# Patient Record
Sex: FEMALE | Race: WHITE | NOT HISPANIC OR LATINO | Employment: FULL TIME | URBAN - METROPOLITAN AREA
[De-identification: names, ages, dates, MRNs, and addresses within clinical notes are randomized per-mention and may not be internally consistent; named-entity substitution may affect disease eponyms.]

---

## 2019-08-08 ENCOUNTER — HOSPITAL ENCOUNTER (EMERGENCY)
Facility: HOSPITAL | Age: 29
Discharge: HOME | End: 2019-08-08
Attending: EMERGENCY MEDICINE
Payer: COMMERCIAL

## 2019-08-08 VITALS
OXYGEN SATURATION: 100 % | TEMPERATURE: 97.9 F | RESPIRATION RATE: 16 BRPM | DIASTOLIC BLOOD PRESSURE: 52 MMHG | SYSTOLIC BLOOD PRESSURE: 110 MMHG | HEART RATE: 94 BPM | WEIGHT: 100.97 LBS

## 2019-08-08 DIAGNOSIS — K04.7 DENTAL ABSCESS: Primary | ICD-10-CM

## 2019-08-08 PROCEDURE — 99282 EMERGENCY DEPT VISIT SF MDM: CPT

## 2019-08-08 PROCEDURE — 99283 EMERGENCY DEPT VISIT LOW MDM: CPT | Performed by: PHYSICIAN ASSISTANT

## 2019-08-08 RX ORDER — CLINDAMYCIN HYDROCHLORIDE 150 MG/1
150 CAPSULE ORAL 4 TIMES DAILY
Qty: 40 CAPSULE | Refills: 0 | Status: SHIPPED | OUTPATIENT
Start: 2019-08-08 | End: 2019-08-16 | Stop reason: SDUPTHER

## 2019-08-08 RX ORDER — CLINDAMYCIN HYDROCHLORIDE 150 MG/1
150 CAPSULE ORAL 4 TIMES DAILY
Qty: 40 CAPSULE | Refills: 0 | Status: SHIPPED | OUTPATIENT
Start: 2019-08-08 | End: 2019-08-19 | Stop reason: HOSPADM

## 2019-08-08 NOTE — ED PROVIDER NOTES
History  Chief Complaint   Patient presents with    Dental Pain     left side dental pain and swelling since yesterday     Patient is a 78-year-old female presents today with a chief complaint of left-sided lower jaw dental pain and swelling  Patient reports she has had previous dental infections and reports poor dentition however notes that she is attempting to follow the dentist and is attempting to make time to make these appointments and the dental recommendations  Patient denies any fevers, chills, difficulty turning her neck, difficulty managing oral secretions, difficulty breathing or difficulty swallowing associated with her dental pain/jaw swelling  Patient reports she does not have any pain and notes that the swelling is what brought her into the emergency department today  Patient reports she has not been taking any medications to alleviate her symptoms and reports last time she had these symptoms she was prescribed an antibiotic  History provided by:  Patient   used: No    Dental Pain   Location:  Lower  Severity:  No pain  Onset quality:  Gradual  Duration:  1 day  Timing:  Constant  Progression:  Unchanged  Chronicity:  Recurrent  Context: abscess, dental caries, dental fracture and poor dentition    Relieved by:  None tried  Worsened by:  Nothing  Ineffective treatments:  None tried  Associated symptoms: no congestion and no fever    Risk factors: lack of dental care, periodontal disease and smoking        None       History reviewed  No pertinent past medical history  History reviewed  No pertinent surgical history  History reviewed  No pertinent family history  I have reviewed and agree with the history as documented      Social History     Tobacco Use    Smoking status: Current Every Day Smoker     Packs/day: 0 25    Smokeless tobacco: Never Used   Substance Use Topics    Alcohol use: Yes     Frequency: Never     Comment: rare    Drug use: Never        Review of Systems   Constitutional: Negative for chills, fatigue and fever  HENT: Positive for dental problem  Negative for congestion, ear pain, rhinorrhea and sore throat  Eyes: Negative for redness  Respiratory: Negative for chest tightness and shortness of breath  Cardiovascular: Negative for chest pain and palpitations  Gastrointestinal: Negative for abdominal pain, nausea and vomiting  Genitourinary: Negative for dysuria and hematuria  Musculoskeletal: Negative  Skin: Negative for rash  Neurological: Negative for dizziness, syncope, light-headedness and numbness  Physical Exam  Physical Exam   Constitutional: She is oriented to person, place, and time  She appears well-developed and well-nourished  HENT:   Head: Normocephalic  Significant dental caries and erosion of all teeth noted in the mouth  Few teeth missing with erosion down into the gum/root  No areas of fluctuance or abscess is noted within the mouth or on the gums  Eyes: No scleral icterus  Cardiovascular: Normal rate and regular rhythm  Pulmonary/Chest: Effort normal and breath sounds normal  No stridor  Abdominal: Soft  She exhibits no distension  There is no tenderness  Musculoskeletal: Normal range of motion  Neurological: She is alert and oriented to person, place, and time  Skin: Skin is warm and dry  Capillary refill takes less than 2 seconds  Psychiatric: She has a normal mood and affect  Nursing note and vitals reviewed        Vital Signs  ED Triage Vitals [08/08/19 1006]   Temperature Pulse Respirations Blood Pressure SpO2   97 9 °F (36 6 °C) 94 16 110/52 100 %      Temp Source Heart Rate Source Patient Position - Orthostatic VS BP Location FiO2 (%)   Tympanic Monitor Sitting Left arm --      Pain Score       4           Vitals:    08/08/19 1006   BP: 110/52   Pulse: 94   Patient Position - Orthostatic VS: Sitting         Visual Acuity      ED Medications  Medications - No data to display    Diagnostic Studies  Results Reviewed     None                 No orders to display              Procedures  Procedures       ED Course                               MDM    Disposition  Final diagnoses:   Dental abscess     Time reflects when diagnosis was documented in both MDM as applicable and the Disposition within this note     Time User Action Codes Description Comment    8/8/2019 10:24 AM Yves Londono Add [K04 7] Dental abscess       ED Disposition     ED Disposition Condition Date/Time Comment    Discharge Good Thu Aug 8, 2019 10:23 AM Cassidy Duverney discharge to home/self care  Follow-up Information     Follow up With Specialties Details Why Contact Info    Howie Park MD Family Medicine Schedule an appointment as soon as possible for a visit  As needed [address] [facility] [address] [facility] [address]             Discharge Medication List as of 8/8/2019 10:27 AM      START taking these medications    Details   !! clindamycin (CLEOCIN) 150 mg capsule Take 1 capsule (150 mg total) by mouth 4 (four) times a day for 10 days, Starting Thu 8/8/2019, Until Sun 8/18/2019, Print      !! clindamycin (CLEOCIN) 150 mg capsule Take 1 capsule (150 mg total) by mouth 4 (four) times a day for 10 days, Starting u 8/8/2019, Until Sun 8/18/2019, Normal       !! - Potential duplicate medications found  Please discuss with provider  No discharge procedures on file      ED Provider  Electronically Signed by           Rebeca Jung PA-C  08/08/19 3481

## 2019-08-16 ENCOUNTER — HOSPITAL ENCOUNTER (EMERGENCY)
Facility: HOSPITAL | Age: 29
Discharge: HOME | End: 2019-08-16
Attending: EMERGENCY MEDICINE | Admitting: EMERGENCY MEDICINE
Payer: COMMERCIAL

## 2019-08-16 ENCOUNTER — HOSPITAL ENCOUNTER (EMERGENCY)
Facility: HOSPITAL | Age: 29
Discharge: HOME | End: 2019-08-16
Payer: COMMERCIAL

## 2019-08-16 VITALS
SYSTOLIC BLOOD PRESSURE: 114 MMHG | TEMPERATURE: 97.6 F | WEIGHT: 97 LBS | HEART RATE: 83 BPM | DIASTOLIC BLOOD PRESSURE: 67 MMHG | RESPIRATION RATE: 18 BRPM | OXYGEN SATURATION: 99 %

## 2019-08-16 VITALS
OXYGEN SATURATION: 99 % | WEIGHT: 99.21 LBS | HEART RATE: 106 BPM | SYSTOLIC BLOOD PRESSURE: 140 MMHG | DIASTOLIC BLOOD PRESSURE: 75 MMHG | TEMPERATURE: 98.4 F | RESPIRATION RATE: 18 BRPM

## 2019-08-16 DIAGNOSIS — T78.40XA ALLERGIC REACTION, INITIAL ENCOUNTER: ICD-10-CM

## 2019-08-16 DIAGNOSIS — L50.9 HIVES: Primary | ICD-10-CM

## 2019-08-16 DIAGNOSIS — L50.8 URTICARIA, ACUTE: Primary | ICD-10-CM

## 2019-08-16 LAB
EXT PREG TEST URINE: NEGATIVE
EXT. CONTROL ED NAV: NORMAL

## 2019-08-16 PROCEDURE — 81025 URINE PREGNANCY TEST: CPT | Performed by: FAMILY MEDICINE

## 2019-08-16 PROCEDURE — 99282 EMERGENCY DEPT VISIT SF MDM: CPT

## 2019-08-16 PROCEDURE — 99283 EMERGENCY DEPT VISIT LOW MDM: CPT | Performed by: EMERGENCY MEDICINE

## 2019-08-16 PROCEDURE — 99284 EMERGENCY DEPT VISIT MOD MDM: CPT | Performed by: PHYSICIAN ASSISTANT

## 2019-08-16 RX ORDER — DESOGESTREL AND ETHINYL ESTRADIOL 0.15-0.03
1 KIT ORAL DAILY
COMMUNITY
Start: 2019-04-16

## 2019-08-16 RX ORDER — FAMOTIDINE 20 MG/1
20 TABLET, FILM COATED ORAL 2 TIMES DAILY
Qty: 30 TABLET | Refills: 0 | Status: ON HOLD | OUTPATIENT
Start: 2019-08-16 | End: 2019-08-19 | Stop reason: SDUPTHER

## 2019-08-16 RX ORDER — PREDNISONE 20 MG/1
60 TABLET ORAL ONCE
Status: COMPLETED | OUTPATIENT
Start: 2019-08-16 | End: 2019-08-16

## 2019-08-16 RX ORDER — DIPHENHYDRAMINE HCL 25 MG
25 TABLET ORAL ONCE
Status: COMPLETED | OUTPATIENT
Start: 2019-08-16 | End: 2019-08-16

## 2019-08-16 RX ORDER — FAMOTIDINE 20 MG/1
40 TABLET, FILM COATED ORAL ONCE
Status: COMPLETED | OUTPATIENT
Start: 2019-08-16 | End: 2019-08-16

## 2019-08-16 RX ORDER — PREDNISONE 20 MG/1
60 TABLET ORAL DAILY
Qty: 15 TABLET | Refills: 0 | Status: SHIPPED | OUTPATIENT
Start: 2019-08-16 | End: 2019-08-19 | Stop reason: HOSPADM

## 2019-08-16 RX ORDER — DIPHENHYDRAMINE HCL 25 MG
25 TABLET ORAL EVERY 6 HOURS
Qty: 20 TABLET | Refills: 0 | Status: SHIPPED | OUTPATIENT
Start: 2019-08-16 | End: 2019-08-19 | Stop reason: HOSPADM

## 2019-08-16 RX ADMIN — DIPHENHYDRAMINE HCL 25 MG: 25 TABLET ORAL at 12:18

## 2019-08-16 RX ADMIN — DIPHENHYDRAMINE HCL 25 MG: 25 TABLET ORAL at 19:19

## 2019-08-16 RX ADMIN — FAMOTIDINE 40 MG: 20 TABLET ORAL at 19:19

## 2019-08-16 RX ADMIN — PREDNISONE 60 MG: 20 TABLET ORAL at 12:17

## 2019-08-16 NOTE — DISCHARGE INSTRUCTIONS
Take 3 steroid pills a day for 5 days  Finish your antibiotic you already started  Use topical benadryl for the itching

## 2019-08-16 NOTE — ED PROVIDER NOTES
History  Chief Complaint   Patient presents with    Itching     "I was here earlier but they medicine they gave me hasn't helped"  " hives are worse now"  denies difficulty breathing     25-year-old female with no significant past medical history presenting for evaluation of rash  Patient was seen earlier today for rash in likely allergic reaction to clindamycin that she started 1 week ago  She was given 60 mg of prednisone along with Benadryl in the ED  Patient was discharged with Benadryl cream along with rx prednisone  She reports that she went home to take a nap and when she woke up she had worsening of her hives and now irritation and inflammation of the right hand  She denies any difficulty breathing coughing stridor or wheezing  No swelling of tongue or lips  Of note, she does report that she just received new tattoo 3 days ago as well  Prior to Admission Medications   Prescriptions Last Dose Informant Patient Reported? Taking? clindamycin (CLEOCIN) 150 mg capsule Not Taking at Unknown time  No No   Sig: Take 1 capsule (150 mg total) by mouth 4 (four) times a day for 10 days   Patient not taking: Reported on 8/16/2019   desogestrel-ethinyl estradiol (APRI) 0 15-30 MG-MCG per tablet   Yes No   Sig: Take 1 tablet by mouth daily   diphenhydrAMINE-zinc acetate (BENADRYL) cream   No No   Sig: Apply topically 3 (three) times a day as needed for itching   predniSONE 20 mg tablet   No No   Sig: Take 3 tablets (60 mg total) by mouth daily for 5 days      Facility-Administered Medications: None       History reviewed  No pertinent past medical history  History reviewed  No pertinent surgical history  History reviewed  No pertinent family history  I have reviewed and agree with the history as documented      Social History     Tobacco Use    Smoking status: Current Every Day Smoker     Packs/day: 0 25    Smokeless tobacco: Never Used   Substance Use Topics    Alcohol use: Yes     Frequency: Never     Comment: rare    Drug use: Never        Review of Systems   All other systems reviewed and are negative  Physical Exam  Physical Exam   Constitutional: She is oriented to person, place, and time  She appears well-developed and well-nourished  No distress  HENT:   Head: Normocephalic and atraumatic  Eyes: Conjunctivae are normal    EOM grossly intact   Neck: Normal range of motion  Neck supple  No JVD present  Cardiovascular: Normal rate  Pulmonary/Chest: Effort normal    Abdominal: Soft  Musculoskeletal:   FROM, steady gait, cap refill brisk, strength and sensation grossly intact throughout   Neurological: She is alert and oriented to person, place, and time  Skin: Skin is warm and dry  Capillary refill takes less than 2 seconds  Psychiatric: She has a normal mood and affect  Her behavior is normal    Nursing note and vitals reviewed        Vital Signs  ED Triage Vitals [08/16/19 1844]   Temperature Pulse Respirations Blood Pressure SpO2   98 4 °F (36 9 °C) (!) 106 18 140/75 99 %      Temp Source Heart Rate Source Patient Position - Orthostatic VS BP Location FiO2 (%)   Tympanic Monitor Sitting Left arm --      Pain Score       --           Vitals:    08/16/19 1844   BP: 140/75   Pulse: (!) 106   Patient Position - Orthostatic VS: Sitting         Visual Acuity      ED Medications  Medications   diphenhydrAMINE (BENADRYL) tablet 25 mg (25 mg Oral Given 8/16/19 1919)   famotidine (PEPCID) tablet 40 mg (40 mg Oral Given 8/16/19 1919)       Diagnostic Studies  Results Reviewed     None                 No orders to display              Procedures  Procedures       ED Course                               MDM  Number of Diagnoses or Management Options  Allergic reaction, initial encounter:   Hives:   Diagnosis management comments: 26 yo F presenting for evaluation of rash, likely urticarial allergic reaction, will give pt pepcid and benadryl here, will send home with rx for same as she was not given when she was d/c earlier today, no resp distress, no sloughing of skin, she is afebrile, non toxic, f/u with pcp    strict return to ED precautions discussed  Pt verbalizes understanding and agrees with plan  Pt is stable for discharge    Portions of the record may have been created with voice recognition software  Occasional wrong word or "sound a like" substitutions may have occurred due to the inherent limitations of voice recognition software  Read the chart carefully and recognize, using context, where substitutions have occurred  Disposition  Final diagnoses:   Hives   Allergic reaction, initial encounter     Time reflects when diagnosis was documented in both MDM as applicable and the Disposition within this note     Time User Action Codes Description Comment    8/16/2019  7:37 PM Fallon Gordon Add [L50 9] Hives     8/16/2019  7:37 PM Hever Monroe 142 Allergic reaction, initial encounter       ED Disposition     ED Disposition Condition Date/Time Comment    Discharge Stable Fri Aug 16, 2019  7:37 PM Nadine Garcia discharge to home/self care              Follow-up Information     Follow up With Specialties Details Why [address] [facility] Medicine Call in 1 day  [address] [facility]  [phone]            Discharge Medication List as of 8/16/2019  7:38 PM      START taking these medications    Details   diphenhydrAMINE (BENADRYL) 25 mg tablet Take 1 tablet (25 mg total) by mouth every 6 (six) hours, Starting Fri 8/16/2019, Normal      famotidine (PEPCID) 20 mg tablet Take 1 tablet (20 mg total) by mouth 2 (two) times a day, Starting Fri 8/16/2019, Normal         CONTINUE these medications which have NOT CHANGED    Details   clindamycin (CLEOCIN) 150 mg capsule Take 1 capsule (150 mg total) by mouth 4 (four) times a day for 10 days, Starting Thu 8/8/2019, Until Sun 8/18/2019, Normal      desogestrel-ethinyl estradiol (APRI) 0 15-30 MG-MCG per tablet Take 1 tablet by mouth daily, Starting Tue 4/16/2019, Historical Med      diphenhydrAMINE-zinc acetate (BENADRYL) cream Apply topically 3 (three) times a day as needed for itching, Starting Fri 8/16/2019, Print      predniSONE 20 mg tablet Take 3 tablets (60 mg total) by mouth daily for 5 days, Starting Fri 8/16/2019, Until Wed 8/21/2019, Print           No discharge procedures on file      ED Provider  Electronically Signed by           Nilsa Perez PA-C  08/16/19 8812

## 2019-08-16 NOTE — ED PROVIDER NOTES
History  Chief Complaint   Patient presents with    Rash     "I broke out in hives all over today, I have been taking Clindamycin all week for my tooth"  + itching     Pleasant 25 yr old female c/o acute onset rash spreading all over her body  A small welt began on her left arm, which she thought was just a bug bite this morning  Has been itching non stop since and rash has spread over all extremeties varying is lesion size  No vesicular lesions or sloughing  No close contacts have similar symptoms  No new detergent, body wash/lotions, or oral intake  Tried topical Cortizone cream with no relief  Patient just wants the itching to stop  No dysphagia, difficulty swallowing secretions, sore throat, tongue swelling or signs of angioedema  Currently patient denies any fever, chills, chest pain, palpitations, light headedness, headaches, vision change, abdominal pain, N/V/D, urinary symptoms  Prior to Admission Medications   Prescriptions Last Dose Informant Patient Reported? Taking? clindamycin (CLEOCIN) 150 mg capsule   No No   Sig: Take 1 capsule (150 mg total) by mouth 4 (four) times a day for 10 days   desogestrel-ethinyl estradiol (APRI) 0 15-30 MG-MCG per tablet   Yes Yes   Sig: Take 1 tablet by mouth daily      Facility-Administered Medications: None       History reviewed  No pertinent past medical history  History reviewed  No pertinent surgical history  History reviewed  No pertinent family history  I have reviewed and agree with the history as documented  Social History     Tobacco Use    Smoking status: Current Every Day Smoker     Packs/day: 0 25    Smokeless tobacco: Never Used   Substance Use Topics    Alcohol use: Yes     Frequency: Never     Comment: rare    Drug use: Never        Review of Systems   Constitutional: Negative  Negative for activity change, appetite change, chills, fatigue and fever  HENT: Negative    Negative for congestion, dental problem, ear pain and sore throat  Eyes: Negative  Respiratory: Negative  Negative for chest tightness and shortness of breath  Cardiovascular: Negative  Negative for chest pain, palpitations and leg swelling  Gastrointestinal: Negative  Negative for abdominal distention, abdominal pain, constipation, diarrhea, nausea and vomiting  Endocrine: Negative  Genitourinary: Negative  Negative for difficulty urinating, dyspareunia and dysuria  Musculoskeletal: Negative  Negative for arthralgias and neck pain  Skin: Positive for rash (pruritic)  Allergic/Immunologic: Negative  Neurological: Negative  Negative for dizziness, weakness, light-headedness, numbness and headaches  Hematological: Negative  Negative for adenopathy  Psychiatric/Behavioral: Negative  Negative for sleep disturbance and suicidal ideas  Physical Exam  ED Triage Vitals [08/16/19 1133]   Temperature Pulse Respirations Blood Pressure SpO2   97 6 °F (36 4 °C) 83 18 114/67 99 %      Temp Source Heart Rate Source Patient Position - Orthostatic VS BP Location FiO2 (%)   Tympanic Monitor Sitting Left arm --      Pain Score       --             Orthostatic Vital Signs  Vitals:    08/16/19 1133   BP: 114/67   Pulse: 83   Patient Position - Orthostatic VS: Sitting       Physical Exam   Constitutional: She is oriented to person, place, and time  She appears well-developed and well-nourished  HENT:   Head: Normocephalic and atraumatic  Eyes: Pupils are equal, round, and reactive to light  EOM are normal    Neck: Normal range of motion  Neck supple  Cardiovascular: Normal rate, regular rhythm, normal heart sounds and intact distal pulses  No murmur heard  Pulmonary/Chest: Effort normal and breath sounds normal  No respiratory distress  She exhibits no tenderness  Abdominal: Soft  Bowel sounds are normal  She exhibits no distension  There is no tenderness  Musculoskeletal: Normal range of motion     Neurological: She is alert and oriented to person, place, and time  Skin: Skin is warm and dry  Capillary refill takes less than 2 seconds  Rash (diffuse) noted  No abrasion, no bruising, no burn, no ecchymosis, no laceration, no lesion, no petechiae and no purpura noted  Rash is urticarial  Rash is not macular, not papular, not maculopapular, not nodular, not pustular and not vesicular  There is erythema  Psychiatric: She has a normal mood and affect  Nursing note and vitals reviewed  ED Medications  Medications   predniSONE tablet 60 mg (60 mg Oral Given 8/16/19 1217)   diphenhydrAMINE (BENADRYL) tablet 25 mg (25 mg Oral Given 8/16/19 1218)       Diagnostic Studies  Results Reviewed     Procedure Component Value Units Date/Time    POCT pregnancy, urine [[id number]]  (Normal) Resulted:  08/16/19 1215    Lab Status:  Final result Updated:  08/16/19 1215     EXT PREG TEST UR (Ref: Negative) negative     Control valid                 No orders to display         Procedures  Procedures        ED Course                               MDM  Number of Diagnoses or Management Options  Urticaria, acute:   Diagnosis management comments: Pleasant 25 yr old female c/o acute onset rash spreading all over her body  Urtical rash is blanching and pruritic  Non-painful with no sloughing, clusters or vesicular lesions  Does not appear to be a bulls-eye rash  Likely idiopathic allergic reaction  Will treat with steroids and benadryl  Discharge patient with benadryl cream and 5 day course of steroids  Patient understands strict return policy if noticing tongue swelling, difficulty breathing, or worsening symptoms         Disposition  Final diagnoses:   Urticaria, acute     Time reflects when diagnosis was documented in both MDM as applicable and the Disposition within this note     Time User Action Codes Description Comment    8/16/2019 12:12 PM NemAnuja bernal Add [L50 8] Urticaria, acute       ED Disposition     ED Disposition Condition Date/Time Comment Discharge Stable Fri Aug 16, 2019 12:17 PM Lalo Duron discharge to home/self care  Follow-up Information     Follow up With Specialties Details Why Contact Info Additional [address] [facility] Emergency Department Emergency Medicine  If symptoms worsen or you have difficulty breathing or swallowing  [address] [address]  [address] [facility]  As needed to establish care with a PCP [address], [address] [facility] [address] [facility], [address], [address] [place], [place], [address]          Discharge Medication List as of 8/16/2019 12:20 PM      START taking these medications    Details   diphenhydrAMINE-zinc acetate (BENADRYL) cream Apply topically 3 (three) times a day as needed for itching, Starting Fri 8/16/2019, Print      predniSONE 20 mg tablet Take 3 tablets (60 mg total) by mouth daily for 5 days, Starting Fri 8/16/2019, Until Wed 8/21/2019, Print         CONTINUE these medications which have NOT CHANGED    Details   desogestrel-ethinyl estradiol (APRI) 0 15-30 MG-MCG per tablet Take 1 tablet by mouth daily, Starting Tue 4/16/2019, Historical Med      clindamycin (CLEOCIN) 150 mg capsule Take 1 capsule (150 mg total) by mouth 4 (four) times a day for 10 days, Starting Thu 8/8/2019, Until Sun 8/18/2019, Normal           No discharge procedures on file  ED Provider  Attending physically available and evaluated Lalo Duron I managed the patient along with the ED Attending      Electronically Signed by         Yola Nash MD  08/16/19 9449

## 2019-08-17 ENCOUNTER — HOSPITAL ENCOUNTER (OUTPATIENT)
Facility: HOSPITAL | Age: 29
Setting detail: OBSERVATION
Discharge: HOME | End: 2019-08-19
Attending: EMERGENCY MEDICINE | Admitting: FAMILY MEDICINE
Payer: COMMERCIAL

## 2019-08-17 DIAGNOSIS — L50.8 ACUTE URTICARIA: ICD-10-CM

## 2019-08-17 DIAGNOSIS — T78.40XA ALLERGIC REACTION, INITIAL ENCOUNTER: ICD-10-CM

## 2019-08-17 DIAGNOSIS — L50.9 HIVES: ICD-10-CM

## 2019-08-17 DIAGNOSIS — L50.9 URTICARIAL DERMATITIS: Primary | ICD-10-CM

## 2019-08-17 PROBLEM — Z72.0 TOBACCO USE: Status: ACTIVE | Noted: 2019-08-17

## 2019-08-17 LAB
ANION GAP SERPL CALCULATED.3IONS-SCNC: 11 MMOL/L (ref 5–14)
BUN SERPL-MCNC: 16 MG/DL (ref 5–25)
CALCIUM SERPL-MCNC: 9.2 MG/DL (ref 8.4–10.2)
CHLORIDE SERPL-SCNC: 99 MMOL/L (ref 97–108)
CO2 SERPL-SCNC: 23 MMOL/L (ref 22–30)
CREAT SERPL-MCNC: 0.5 MG/DL (ref 0.6–1.2)
ERYTHROCYTE [DISTWIDTH] IN BLOOD BY AUTOMATED COUNT: 15.9 %
GFR SERPL CREATININE-BSD FRML MDRD: 130 ML/MIN/1.73SQ M
GLUCOSE SERPL-MCNC: 186 MG/DL (ref 70–99)
HCT VFR BLD AUTO: 45.8 % (ref 36–46)
HGB BLD-MCNC: 14.7 G/DL (ref 12–16)
LYMPHOCYTES # BLD AUTO: 1.06 THOUSAND/UL (ref 0.5–4)
LYMPHOCYTES # BLD AUTO: 10 % (ref 25–45)
MCH RBC QN AUTO: 24.4 PG (ref 26–34)
MCHC RBC AUTO-ENTMCNC: 32 G/DL (ref 31–36)
MCV RBC AUTO: 76 FL (ref 80–100)
MONOCYTES # BLD AUTO: 0.32 THOUSAND/UL (ref 0.2–0.9)
MONOCYTES NFR BLD AUTO: 3 % (ref 1–10)
NEUTS BAND NFR BLD MANUAL: 7 % (ref 0–8)
NEUTS SEG # BLD: 9.22 THOUSAND/UL (ref 1.8–7.8)
NEUTS SEG NFR BLD AUTO: 80 %
PLATELET # BLD AUTO: 175 THOUSANDS/UL (ref 150–450)
PLATELET BLD QL SMEAR: ADEQUATE
PMV BLD AUTO: 10.4 FL (ref 8.9–12.7)
POTASSIUM SERPL-SCNC: 4.2 MMOL/L (ref 3.6–5)
RBC # BLD AUTO: 6.01 MILLION/UL (ref 4–5.2)
RBC MORPH BLD: NORMAL
SODIUM SERPL-SCNC: 133 MMOL/L (ref 137–147)
TOTAL CELLS COUNTED SPEC: 100
WBC # BLD AUTO: 10.6 THOUSAND/UL (ref 4.5–11)

## 2019-08-17 PROCEDURE — 99284 EMERGENCY DEPT VISIT MOD MDM: CPT

## 2019-08-17 PROCEDURE — 99284 EMERGENCY DEPT VISIT MOD MDM: CPT | Performed by: PHYSICIAN ASSISTANT

## 2019-08-17 PROCEDURE — 36415 COLL VENOUS BLD VENIPUNCTURE: CPT | Performed by: PHYSICIAN ASSISTANT

## 2019-08-17 PROCEDURE — 80048 BASIC METABOLIC PNL TOTAL CA: CPT | Performed by: PHYSICIAN ASSISTANT

## 2019-08-17 PROCEDURE — 85007 BL SMEAR W/DIFF WBC COUNT: CPT | Performed by: PHYSICIAN ASSISTANT

## 2019-08-17 PROCEDURE — 96374 THER/PROPH/DIAG INJ IV PUSH: CPT

## 2019-08-17 PROCEDURE — 85027 COMPLETE CBC AUTOMATED: CPT | Performed by: PHYSICIAN ASSISTANT

## 2019-08-17 PROCEDURE — 99219 PR INITIAL OBSERVATION CARE/DAY 50 MINUTES: CPT | Performed by: FAMILY MEDICINE

## 2019-08-17 RX ORDER — HYDROXYZINE HYDROCHLORIDE 25 MG/1
50 TABLET, FILM COATED ORAL EVERY 6 HOURS
Status: DISCONTINUED | OUTPATIENT
Start: 2019-08-17 | End: 2019-08-19 | Stop reason: HOSPADM

## 2019-08-17 RX ORDER — FAMOTIDINE 20 MG/1
20 TABLET, FILM COATED ORAL 2 TIMES DAILY
Status: DISCONTINUED | OUTPATIENT
Start: 2019-08-17 | End: 2019-08-19 | Stop reason: HOSPADM

## 2019-08-17 RX ORDER — LORATADINE 10 MG/1
10 TABLET ORAL DAILY
Status: DISCONTINUED | OUTPATIENT
Start: 2019-08-18 | End: 2019-08-19 | Stop reason: HOSPADM

## 2019-08-17 RX ORDER — DIPHENHYDRAMINE HYDROCHLORIDE 50 MG/ML
50 INJECTION INTRAMUSCULAR; INTRAVENOUS ONCE
Status: COMPLETED | OUTPATIENT
Start: 2019-08-17 | End: 2019-08-17

## 2019-08-17 RX ORDER — ONDANSETRON 2 MG/ML
4 INJECTION INTRAMUSCULAR; INTRAVENOUS EVERY 6 HOURS PRN
Status: DISCONTINUED | OUTPATIENT
Start: 2019-08-17 | End: 2019-08-19 | Stop reason: HOSPADM

## 2019-08-17 RX ORDER — METHYLPREDNISOLONE SODIUM SUCCINATE 40 MG/ML
40 INJECTION, POWDER, LYOPHILIZED, FOR SOLUTION INTRAMUSCULAR; INTRAVENOUS EVERY 8 HOURS SCHEDULED
Status: DISCONTINUED | OUTPATIENT
Start: 2019-08-17 | End: 2019-08-19 | Stop reason: HOSPADM

## 2019-08-17 RX ORDER — SODIUM CHLORIDE, SODIUM LACTATE, POTASSIUM CHLORIDE, CALCIUM CHLORIDE 600; 310; 30; 20 MG/100ML; MG/100ML; MG/100ML; MG/100ML
100 INJECTION, SOLUTION INTRAVENOUS CONTINUOUS
Status: DISCONTINUED | OUTPATIENT
Start: 2019-08-17 | End: 2019-08-19

## 2019-08-17 RX ORDER — METHYLPREDNISOLONE SODIUM SUCCINATE 125 MG/2ML
150 INJECTION, POWDER, LYOPHILIZED, FOR SOLUTION INTRAMUSCULAR; INTRAVENOUS ONCE
Status: COMPLETED | OUTPATIENT
Start: 2019-08-17 | End: 2019-08-17

## 2019-08-17 RX ORDER — DIPHENHYDRAMINE HYDROCHLORIDE, ZINC ACETATE 2; .1 G/100G; G/100G
CREAM TOPICAL 3 TIMES DAILY PRN
Status: DISCONTINUED | OUTPATIENT
Start: 2019-08-17 | End: 2019-08-19 | Stop reason: HOSPADM

## 2019-08-17 RX ADMIN — SODIUM CHLORIDE, SODIUM LACTATE, POTASSIUM CHLORIDE, AND CALCIUM CHLORIDE 100 ML/HR: .6; .31; .03; .02 INJECTION, SOLUTION INTRAVENOUS at 17:26

## 2019-08-17 RX ADMIN — FAMOTIDINE 20 MG: 20 TABLET ORAL at 17:23

## 2019-08-17 RX ADMIN — METHYLPREDNISOLONE SODIUM SUCCINATE 40 MG: 40 INJECTION, POWDER, FOR SOLUTION INTRAMUSCULAR; INTRAVENOUS at 21:41

## 2019-08-17 RX ADMIN — METHYLPREDNISOLONE SODIUM SUCCINATE 150 MG: 125 INJECTION, POWDER, FOR SOLUTION INTRAMUSCULAR; INTRAVENOUS at 16:05

## 2019-08-17 RX ADMIN — DIPHENHYDRAMINE HYDROCHLORIDE 50 MG: 50 INJECTION INTRAMUSCULAR; INTRAVENOUS at 15:51

## 2019-08-17 RX ADMIN — HYDROXYZINE HYDROCHLORIDE 50 MG: 25 TABLET ORAL at 17:22

## 2019-08-17 NOTE — PLAN OF CARE
Problem: PAIN - ADULT  Goal: Verbalizes/displays adequate comfort level or baseline comfort level  Description  Interventions:  - Encourage patient to monitor pain and request assistance  - Assess pain using appropriate pain scale  - Administer analgesics based on type and severity of pain and evaluate response  - Implement non-pharmacological measures as appropriate and evaluate response  - Consider cultural and social influences on pain and pain management  - Notify physician/advanced practitioner if interventions unsuccessful or patient reports new pain  Outcome: Progressing     Problem: INFECTION - ADULT  Goal: Absence or prevention of progression during hospitalization  Description  INTERVENTIONS:  - Assess and monitor for signs and symptoms of infection  - Monitor lab/diagnostic results  - Monitor all insertion sites, i e  indwelling lines, tubes, and drains  - Monitor endotracheal if appropriate and nasal secretions for changes in amount and color  - [place] appropriate cooling/warming therapies per order  - Administer medications as ordered  - Instruct and encourage patient and family to use good hand hygiene technique  - Identify and instruct in appropriate isolation precautions for identified infection/condition  Outcome: Progressing  Goal: Absence of fever/infection during neutropenic period  Description  INTERVENTIONS:  - Monitor WBC    Outcome: Progressing     Problem: SAFETY ADULT  Goal: Patient will remain free of falls  Description  INTERVENTIONS:  - Assess patient frequently for physical needs  -  Identify cognitive and physical deficits and behaviors that affect risk of falls    -  [place] fall precautions as indicated by assessment   - Educate patient/family on patient safety including physical limitations  - Instruct patient to call for assistance with activity based on assessment  - Modify environment to reduce risk of injury  - Consider OT/PT consult to assist with strengthening/mobility  Outcome: Progressing  Goal: Maintain or return to baseline ADL function  Description  INTERVENTIONS:  -  Assess patient's ability to carry out ADLs; assess patient's baseline for ADL function and identify physical deficits which impact ability to perform ADLs (bathing, care of mouth/teeth, toileting, grooming, dressing, etc )  - Assess/evaluate cause of self-care deficits   - Assess range of motion  - Assess patient's mobility; develop plan if impaired  - Assess patient's need for assistive devices and provide as appropriate  - Encourage maximum independence but intervene and supervise when necessary  - Involve family in performance of ADLs  - Assess for home care needs following discharge   - Consider OT consult to assist with ADL evaluation and planning for discharge  - Provide patient education as appropriate  Outcome: Progressing  Goal: Maintain or return mobility status to optimal level  Description  INTERVENTIONS:  - Assess patient's baseline mobility status (ambulation, transfers, stairs, etc )    - Identify cognitive and physical deficits and behaviors that affect mobility  - Identify mobility aids required to assist with transfers and/or ambulation (gait belt, sit-to-stand, lift, walker, cane, etc )  - [place] fall precautions as indicated by assessment  - Record patient progress and toleration of activity level on Mobility SBAR; progress patient to next Phase/Stage  - Instruct patient to call for assistance with activity based on assessment  - Consider rehabilitation consult to assist with strengthening/weightbearing, etc   Outcome: Progressing     Problem: DISCHARGE PLANNING  Goal: Discharge to home or other facility with appropriate resources  Description  INTERVENTIONS:  - Identify barriers to discharge w/patient and caregiver  - Arrange for needed discharge resources and transportation as appropriate  - Identify discharge learning needs (meds, wound care, etc )  - Arrange for interpretive services to assist at discharge as needed  - Refer to Case Management Department for coordinating discharge planning if the patient needs post-hospital services based on physician/advanced practitioner order or complex needs related to functional status, cognitive ability, or social support system  Outcome: Progressing     Problem: Knowledge Deficit  Goal: Patient/family/caregiver demonstrates understanding of disease process, treatment plan, medications, and discharge instructions  Description  Complete learning assessment and assess knowledge base    Interventions:  - Provide teaching at level of understanding  - Provide teaching via preferred learning methods  Outcome: Progressing

## 2019-08-17 NOTE — NURSING NOTE
PT arrived on floor @ 1700 from ER AOX3 HR regular Lungs clear + Bowel sounds x4 + PP ABD soft  Denies any pain  Generalized Rash PT denies itching

## 2019-08-17 NOTE — ED PROVIDER NOTES
History  Chief Complaint   Patient presents with    Rash     Seen twice yesterday for rash/hives, bilateral hand swelling worse today, taking meds  Denies any trouble breathing  This is a 24 y/o F with no significant PMHx who presents today for an urticarial rash that has been present x 2 days  The patient was seen twice yesterday for the same thing  She reports her rash is significantly worse today  She was put on PO prednisone and told to take benadryl  She reports she vomited the benadryl this AM  Only known documented new medication is clindamycin (she took this over a week ago)  She reports a new tattoo that she got about 3 days ago  She has gotten tattoos at this parlor in the past  She denies any respiratory distress  She has gross facial and hand swelling  Patient did fill medications  Spoke to [facility] who saw patient yesterday - reports rash is significantly worse today  History provided by:  Patient  Rash   Location:  Full body  Quality: itchiness, redness and swelling    Severity:  Moderate  Onset quality:  Sudden  Duration:  2 days  Timing:  Constant  Progression:  Worsening  Chronicity:  New  Context: chemical exposure and medications    Context: not animal contact, not diapers, not eggs, not exposure to similar rash, not food, not hot tub use, not insect bite/sting, not new detergent/soap, not nuts, not plant contact, not pollen, not pregnancy, not sick contacts and not sun exposure    Relieved by:   Antihistamines  Worsened by:  Contact  Ineffective treatments:  Antibiotic cream and antihistamines  Associated symptoms: periorbital edema    Associated symptoms: no abdominal pain, no diarrhea, no fatigue, no fever, no headaches, no hoarse voice, no induration, no joint pain, no myalgias, no nausea, no shortness of breath, no sore throat, no throat swelling, no tongue swelling, no URI, not vomiting and not wheezing        Prior to Admission Medications   Prescriptions Last Dose Informant Patient Reported? Taking? clindamycin (CLEOCIN) 150 mg capsule   No No   Sig: Take 1 capsule (150 mg total) by mouth 4 (four) times a day for 10 days   Patient not taking: Reported on 8/16/2019   desogestrel-ethinyl estradiol (APRI) 0 15-30 MG-MCG per tablet   Yes No   Sig: Take 1 tablet by mouth daily   diphenhydrAMINE (BENADRYL) 25 mg tablet   No No   Sig: Take 1 tablet (25 mg total) by mouth every 6 (six) hours   diphenhydrAMINE-zinc acetate (BENADRYL) cream   No No   Sig: Apply topically 3 (three) times a day as needed for itching   famotidine (PEPCID) 20 mg tablet   No No   Sig: Take 1 tablet (20 mg total) by mouth 2 (two) times a day   predniSONE 20 mg tablet   No No   Sig: Take 3 tablets (60 mg total) by mouth daily for 5 days      Facility-Administered Medications: None       History reviewed  No pertinent past medical history  History reviewed  No pertinent surgical history  History reviewed  No pertinent family history  I have reviewed and agree with the history as documented  Social History     Tobacco Use    Smoking status: Current Every Day Smoker     Packs/day: 0 25    Smokeless tobacco: Never Used   Substance Use Topics    Alcohol use: Yes     Frequency: Never     Comment: rare    Drug use: Never        Review of Systems   Constitutional: Negative  Negative for fatigue and fever  HENT: Negative  Negative for hoarse voice and sore throat  Eyes: Negative  Respiratory: Negative  Negative for shortness of breath and wheezing  Cardiovascular: Negative  Gastrointestinal: Negative  Negative for abdominal pain, diarrhea, nausea and vomiting  Endocrine: Negative  Genitourinary: Negative  Musculoskeletal: Negative  Negative for arthralgias and myalgias  Skin: Positive for rash  Allergic/Immunologic: Negative  Neurological: Negative  Negative for headaches  Hematological: Negative  Psychiatric/Behavioral: Negative                      Physical Exam  Physical Exam   Constitutional: She is oriented to person, place, and time  She appears well-developed and well-nourished  HENT:   Head: Normocephalic and atraumatic  Mouth/Throat: Uvula is midline and oropharynx is clear and moist    Eyes:   Noted swelling of b/l orbits  Patient able to open eyes  No drainage or erythema/tenderness  No blurred vision  Neck: Normal range of motion  Neck supple  No JVD present  No tracheal deviation present  No thyromegaly present  Cardiovascular: Normal rate, regular rhythm and normal heart sounds  Pulmonary/Chest: Effort normal and breath sounds normal  No stridor  No respiratory distress  She has no wheezes  She has no rales  She exhibits no tenderness  Lymphadenopathy:     She has no cervical adenopathy  Neurological: She is alert and oriented to person, place, and time  Skin: Skin is warm  Rash noted  Noted diffuse urticarial rash present on upper and lower extremities          Vital Signs  ED Triage Vitals   Temperature Pulse Respirations Blood Pressure SpO2   08/17/19 1424 08/17/19 1424 08/17/19 1424 08/17/19 1424 08/17/19 1424   98 7 °F (37 1 °C) 95 16 115/83 97 %      Temp Source Heart Rate Source Patient Position - Orthostatic VS BP Location FiO2 (%)   08/17/19 1424 08/17/19 1424 08/17/19 1424 08/17/19 1424 --   Tympanic Monitor Lying Left arm       Pain Score       08/17/19 1607       4           Vitals:    08/17/19 1424 08/17/19 1607 08/17/19 1638   BP: 115/83 110/80 113/64   Pulse: 95 89 91   Patient Position - Orthostatic VS: Lying Sitting Lying         Visual Acuity      ED Medications  Medications   diphenhydrAMINE-zinc acetate (BENADRYL) 2-0 1 % cream (has no administration in time range)   famotidine (PEPCID) tablet 20 mg (has no administration in time range)   methylPREDNISolone sodium succinate (Solu-MEDROL) injection 40 mg (has no administration in time range)   loratadine (CLARITIN) tablet 10 mg (has no administration in time range) hydrOXYzine HCL (ATARAX) tablet 50 mg (has no administration in time range)   ondansetron (ZOFRAN) injection 4 mg (has no administration in time range)   lactated ringers infusion (has no administration in time range)   diphenhydrAMINE (BENADRYL) injection 50 mg (50 mg Intravenous Given 8/17/19 1551)   methylPREDNISolone sodium succinate (Solu-MEDROL) injection 150 mg (150 mg Intravenous Given 8/17/19 1605)       Diagnostic Studies  Results Reviewed     Procedure Component Value Units Date/Time    Basic metabolic panel [[id number]]  (Abnormal) Collected:  08/17/19 1552    Lab Status:  Final result Specimen:  Blood from Arm, Right Updated:  08/17/19 1617     Sodium 133 mmol/L      Potassium 4 2 mmol/L      Chloride 99 mmol/L      CO2 23 mmol/L      ANION GAP 11 mmol/L      BUN 16 mg/dL      Creatinine 0 50 mg/dL      Glucose 186 mg/dL      Calcium 9 2 mg/dL      eGFR 130 ml/min/1 73sq m     Narrative:       [place] guidelines for Chronic Kidney Disease (CKD):     Stage 1 with normal or high GFR (GFR > 90 mL/min/1 73 square meters)    Stage 2 Mild CKD (GFR = 60-89 mL/min/1 73 square meters)    Stage 3A Moderate CKD (GFR = 45-59 mL/min/1 73 square meters)    Stage 3B Moderate CKD (GFR = 30-44 mL/min/1 73 square meters)    Stage 4 Severe CKD (GFR = 15-29 mL/min/1 73 square meters)    Stage 5 End Stage CKD (GFR <15 mL/min/1 73 square meters)  Note: GFR calculation is accurate only with a steady state creatinine    CBC and differential [[id number]]  (Abnormal) Collected:  08/17/19 1552    Lab Status:  Final result Specimen:  Blood from Arm, Right Updated:  08/17/19 1603     WBC 10 60 Thousand/uL      RBC 6 01 Million/uL      Hemoglobin 14 7 g/dL      Hematocrit 45 8 %      MCV 76 fL      MCH 24 4 pg      MCHC 32 0 g/dL      RDW 15 9 %      MPV 10 4 fL      Platelets 104 Thousands/uL                  No orders to display              Procedures  Procedures       ED Course MDM  Number of Diagnoses or Management Options  Urticarial dermatitis:   Diagnosis management comments: This is a 26 y/o F who presents for the 3rd time to the ER for a diffuse urticarial rash  Per patient, the rash is worsening despite PO steroids and benadryl  The patient reports her eyes are swollen now  She has no respiratory distress or wheezing on examination  Labs are normal  Spoke to hospitalist who agrees to admit patient overnight for IV solumedrol and benadryl for relief  Disposition  Final diagnoses:   Urticarial dermatitis     Time reflects when diagnosis was documented in both MDM as applicable and the Disposition within this note     Time User Action Codes Description Comment    8/17/2019  5:00 PM Matthew SANTILLAN Add [L50 9] Urticarial dermatitis       ED Disposition     ED Disposition Condition Date/Time Comment    Admit Stable Sat Aug 17, 2019  3:59 PM Case was discussed with Dr Diony Rai and the patient's admission status was agreed to be Admission Status: observation status to the service of Dr Diony Rai   Follow-up Information    None         Current Discharge Medication List      CONTINUE these medications which have NOT CHANGED    Details   clindamycin (CLEOCIN) 150 mg capsule Take 1 capsule (150 mg total) by mouth 4 (four) times a day for 10 days  Qty: 40 capsule, Refills: 0    Associated Diagnoses: Dental abscess      desogestrel-ethinyl estradiol (APRI) 0 15-30 MG-MCG per tablet Take 1 tablet by mouth daily      diphenhydrAMINE (BENADRYL) 25 mg tablet Take 1 tablet (25 mg total) by mouth every 6 (six) hours  Qty: 20 tablet, Refills: 0    Associated Diagnoses: Hives;  Allergic reaction, initial encounter      diphenhydrAMINE-zinc acetate (BENADRYL) cream Apply topically 3 (three) times a day as needed for itching  Qty: 28 3 g, Refills: 0    Associated Diagnoses: Urticaria, acute      famotidine (PEPCID) 20 mg tablet Take 1 tablet (20 mg total) by mouth 2 (two) times a day  Qty: 30 tablet, Refills: 0    Associated Diagnoses: Hives; Allergic reaction, initial encounter      predniSONE 20 mg tablet Take 3 tablets (60 mg total) by mouth daily for 5 days  Qty: 15 tablet, Refills: 0    Associated Diagnoses: Urticaria, acute           No discharge procedures on file      ED Provider  Electronically Signed by           Dulce Donahue PA-C  08/17/19 7060

## 2019-08-17 NOTE — H&P
H&P- Idalia Chanel 1990, 25 y o  female MRN: [id number]    Unit/Bed#: [facility] Encounter: [id number]    Primary Care Provider: No primary care provider on file  Date and time admitted to hospital: 8/17/2019  2:19 PM        Tobacco use  Assessment & Plan  · Quarter pack a day she does not want any nicotine replacement therapy  * Acute urticaria  Assessment & Plan  · Worsened and did not respond to treatment -Predisposing factors could be she has been on clindamycin for 8 days for dental procedure  She also had a new tattoo placed 3 days ago  She did not try any new foods there is no new detergents  New new pet exposures  The rash started yesterday started on the arms she came to the ER twice she gave her Benadryl and prednisone set her home she slept woke up and it spread and now her eyes are swollen and her hands are swollen it is really itchy  She never had this before  · She received 2 doses of Benadryl she actually took 1 last night and today in the ER she vomited from it  She does not know if she has any previous reaction to Benadryl but she never had it  · Will provide Solu-Medrol 40 mg IV q 8 hours, diet in milk and fruit for now  Will proceed with Atarax 50 mg every 6 hours as she had nausea to Benadryl, will provide Zofran 4 mg every 6 hours p r n  Just in case, she has no angioedema or any difficulty breathing  Will provide Claritin 10 mg p o  Daily  Provide Pepcid 20 mg p  O  B i d  Benadryl cream to apply to extremities strong can back     · Labs reviewed  · No history of anything like this  · Provide fluids  mL/hour         VTE Prophylaxis: Pharmacologic VTE Prophylaxis contraindicated due to Low risk  / sequential compression device   Code Status: full code  POLST: There is no POLST form on file for this patient (pre-hospital)  Discussion with family: patient and mother    Anticipated Length of Stay:  Patient will be admitted on an Observation basis with an anticipated length of stay of  < 2 midnights  Justification for Hospital Stay:  Acute your took area did not respond to treatment worsened    Total Time for Visit, including Counseling / Coordination of Care: 1 hour  Greater than 50% of this total time spent on direct patient counseling and coordination of care  Chief Complaint:   Worsening rash    History of Present Illness:    Gisselle Ortega is a 25 y o  female who presents with a rash that started yesterday on her arms she presented to the ER twice she received prednisone and Benadryl went home when she woke up today she had will spread to the trunk the back and the arms and the legs with eye swelling hand swelling  She recently was on clindamycin for 8 days for dental procedure and also she 3 days ago she had received at type 2 to different artist did it on her upper extremities she has previous tattoos as well  She took a Benadryl dose yesterday but she vomited it she does not know if she has a reaction of nausea to Benadryl she never took prior  She never had this rash of you take area before it is very itchy  She had no new exposure to food no new exposure to pets no new exposure to detergent  She had a stroke bruit last night  Denies any chest pain or shortness of breath  Review of Systems:    Review of Systems   Constitutional: Negative for fatigue and fever  HENT: Negative  Negative for ear pain, rhinorrhea and sore throat  Eyes: Negative  Negative for pain and itching  Respiratory: Negative  Negative for cough, chest tightness, shortness of breath and wheezing  Cardiovascular: Negative  Negative for chest pain, palpitations and leg swelling  Gastrointestinal: Negative  Negative for abdominal pain, diarrhea, nausea and vomiting  Endocrine: Negative for polydipsia, polyphagia and polyuria  Genitourinary: Negative for dysuria and flank pain  Musculoskeletal: Negative  Negative for back pain and myalgias  Skin: Positive for rash   Negative for wound    Neurological: Negative  Negative for dizziness, syncope, speech difficulty, weakness, light-headedness, numbness and headaches  Psychiatric/Behavioral: Negative for agitation, confusion and decreased concentration  All other systems reviewed and are negative  Past Medical and Surgical History:     History reviewed  No pertinent past medical history  History reviewed  No pertinent surgical history  Meds/Allergies:    Prior to Admission medications    Medication Sig Start Date End Date Taking? Authorizing Provider   clindamycin (CLEOCIN) 150 mg capsule Take 1 capsule (150 mg total) by mouth 4 (four) times a day for 10 days  Patient not taking: Reported on 8/16/2019 8/8/19 8/18/19  German Rodriguez PA-C   desogestrel-ethinyl estradiol (APRI) 0 15-30 MG-MCG per tablet Take 1 tablet by mouth daily 4/16/19   Historical Provider, MD   diphenhydrAMINE (BENADRYL) 25 mg tablet Take 1 tablet (25 mg total) by mouth every 6 (six) hours 8/16/19   Talia Britton PA-C   diphenhydrAMINE-zinc acetate (BENADRYL) cream Apply topically 3 (three) times a day as needed for itching 8/16/19   Neel Small MD   famotidine (PEPCID) 20 mg tablet Take 1 tablet (20 mg total) by mouth 2 (two) times a day 8/16/19   Talia Britton PA-C   predniSONE 20 mg tablet Take 3 tablets (60 mg total) by mouth daily for 5 days 8/16/19 8/21/19  Neel Small MD     I have reviewed home medications with patient personally  Allergies: Allergies   Allergen Reactions    Clindamycin Hives       Social History:     Marital Status: Single   Substance Use History:   Social History     Substance and Sexual Activity   Alcohol Use Yes    Frequency: Never    Comment: rare     Social History     Tobacco Use   Smoking Status Current Every Day Smoker    Packs/day: 0 25   Smokeless Tobacco Never Used     Social History     Substance and Sexual Activity   Drug Use Never       Family History:    History reviewed   No pertinent family history  Physical Exam:     Vitals:   Blood Pressure: 113/64 (08/17/19 1638)  Pulse: 91 (08/17/19 1638)  Temperature: 98 7 °F (37 1 °C) (08/17/19 1424)  Temp Source: Tympanic (08/17/19 1424)  Respirations: 22 (08/17/19 1638)  Weight - Scale: 45 kg (99 lb 3 3 oz) (08/17/19 1607)  SpO2: 97 % (08/17/19 1638)    Physical Exam   Constitutional: She is oriented to person, place, and time  She appears well-developed and well-nourished  HENT:   Head: Normocephalic and atraumatic  Eyes: Pupils are equal, round, and reactive to light  EOM are normal    Neck: Normal range of motion  Cardiovascular: Normal rate, regular rhythm and normal heart sounds  Pulmonary/Chest: Effort normal and breath sounds normal    Abdominal: Soft  Bowel sounds are normal    Musculoskeletal: Normal range of motion  She exhibits edema (Bilateral hands/ also mild on the feet)  Neurological: She is alert and oriented to person, place, and time  She has normal reflexes  cranial nerve 2-12 are normal   Normal neurological exam   Skin: Skin is warm  Rash (Diffuse aortic area on the trunk back upper extremities lower extremities swollen eyes and the left temple there is aortic area) noted  Psychiatric: She has a normal mood and affect                         Additional Data:     Lab Results: I have personally reviewed pertinent films in PACS    Results from last 7 days   Lab Units 08/17/19  1552   WBC Thousand/uL 10 60   HEMOGLOBIN g/dL 14 7   HEMATOCRIT % 45 8   PLATELETS Thousands/uL 175   BANDS PCT % 7   LYMPHO PCT % 10*   MONO PCT % 3     Results from last 7 days   Lab Units 08/17/19  1552   SODIUM mmol/L 133*   POTASSIUM mmol/L 4 2   CHLORIDE mmol/L 99   CO2 mmol/L 23   BUN mg/dL 16   CREATININE mg/dL 0 50*   ANION GAP mmol/L 11   CALCIUM mg/dL 9 2   GLUCOSE RANDOM mg/dL 186*                       Imaging: not available     No orders to display       EKG, Pathology, and Other Studies Reviewed on Admission:   · EKG: not available    [facility] / [facility] Records Reviewed: Yes     ** Please Note: This note has been constructed using a voice recognition system   **

## 2019-08-17 NOTE — ASSESSMENT & PLAN NOTE
· Worsened and did not respond to treatment -Predisposing factors could be she has been on clindamycin for 8 days for dental procedure  She also had a new tattoo placed 3 days ago  She did not try any new foods there is no new detergents  New new pet exposures  The rash started yesterday started on the arms she came to the ER twice she gave her Benadryl and prednisone set her home she slept woke up and it spread and now her eyes are swollen and her hands are swollen it is really itchy  She never had this before  · She received 2 doses of Benadryl she actually took 1 last night and today in the ER she vomited from it  She does not know if she has any previous reaction to Benadryl but she never had it  · Will provide Solu-Medrol 40 mg IV q 8 hours, diet in milk and fruit for now  Will proceed with Atarax 50 mg every 6 hours as she had nausea to Benadryl, will provide Zofran 4 mg every 6 hours p r n  Just in case, she has no angioedema or any difficulty breathing  Will provide Claritin 10 mg p o  Daily  Provide Pepcid 20 mg p  O  B i d  Benadryl cream to apply to extremities strong can back     · Labs reviewed  · No history of anything like this  · Provide fluids  mL/hour

## 2019-08-18 PROCEDURE — 99225 PR SBSQ OBSERVATION CARE/DAY 25 MINUTES: CPT | Performed by: FAMILY MEDICINE

## 2019-08-18 RX ADMIN — HYDROXYZINE HYDROCHLORIDE 50 MG: 25 TABLET ORAL at 11:33

## 2019-08-18 RX ADMIN — HYDROXYZINE HYDROCHLORIDE 50 MG: 25 TABLET ORAL at 17:15

## 2019-08-18 RX ADMIN — SODIUM CHLORIDE, SODIUM LACTATE, POTASSIUM CHLORIDE, AND CALCIUM CHLORIDE 100 ML/HR: .6; .31; .03; .02 INJECTION, SOLUTION INTRAVENOUS at 00:04

## 2019-08-18 RX ADMIN — METHYLPREDNISOLONE SODIUM SUCCINATE 40 MG: 40 INJECTION, POWDER, FOR SOLUTION INTRAMUSCULAR; INTRAVENOUS at 13:53

## 2019-08-18 RX ADMIN — LORATADINE 10 MG: 10 TABLET ORAL at 10:10

## 2019-08-18 RX ADMIN — HYDROXYZINE HYDROCHLORIDE 50 MG: 25 TABLET ORAL at 00:04

## 2019-08-18 RX ADMIN — METHYLPREDNISOLONE SODIUM SUCCINATE 40 MG: 40 INJECTION, POWDER, FOR SOLUTION INTRAMUSCULAR; INTRAVENOUS at 22:06

## 2019-08-18 RX ADMIN — SODIUM CHLORIDE, SODIUM LACTATE, POTASSIUM CHLORIDE, AND CALCIUM CHLORIDE 100 ML/HR: .6; .31; .03; .02 INJECTION, SOLUTION INTRAVENOUS at 13:55

## 2019-08-18 RX ADMIN — FAMOTIDINE 20 MG: 20 TABLET ORAL at 17:15

## 2019-08-18 RX ADMIN — METHYLPREDNISOLONE SODIUM SUCCINATE 40 MG: 40 INJECTION, POWDER, FOR SOLUTION INTRAMUSCULAR; INTRAVENOUS at 05:26

## 2019-08-18 RX ADMIN — HYDROXYZINE HYDROCHLORIDE 50 MG: 25 TABLET ORAL at 05:26

## 2019-08-18 RX ADMIN — FAMOTIDINE 20 MG: 20 TABLET ORAL at 10:10

## 2019-08-18 RX ADMIN — HYDROXYZINE HYDROCHLORIDE 50 MG: 25 TABLET ORAL at 23:59

## 2019-08-18 NOTE — ASSESSMENT & PLAN NOTE
· Improved on legs- to almost resolution eye swelling resolved on left - and some left over on right lower lid - although some rash spread more to trunk and back and face - suspect this is drug reaction urticaria 2/2 clindamycin will like to keep in hospital one more day on present treatment if charli shows more improvement and no more spread will dc home

## 2019-08-18 NOTE — NURSING NOTE
Pt sleeping, easily woken when called by name  Reports improvement in rash, almost completelt resolved on BL legs  Still present on trunk, back, face and BL arms  Slight swelling still present on right eye  Denies itching  Pt also denies any shortness of breath  Lungs CTA  Denies N/V/D  Call bell within reach

## 2019-08-18 NOTE — UTILIZATION REVIEW
Initial Clinical Review    Admission: Date/Time/Statement: OBSERVATION 8/17/19 @ 1600    Orders Placed This Encounter   Procedures    Place in Observation (expected length of stay for this patient is less than two midnights)     Standing Status:   Standing     Number of Occurrences:   1     Order Specific Question:   Admitting Physician     Answer:   Pilo Castaneda [[id number]]     Order Specific Question:   Level of Care     Answer:   Med Surg [16]     ED Arrival Information     Expected Arrival Acuity Means of Arrival Escorted By Service Admission Type    - 8/17/2019 14:02 Urgent Walk-In Self Hospitalist Urgent    Arrival Complaint    hives/rash        Chief Complaint   Patient presents with    Rash     Seen twice yesterday for rash/hives, bilateral hand swelling worse today, taking meds  Denies any trouble breathing  Assessment/Plan:   MS MOSS IS A 29 YO FEMALE WHO PRESENTS TO THE ED FROM HOME WITH C/O RASH THAT STARTED ON HER ARMS AND THEN SPREAD TO HER TRUNK, BACK, ARMS, LEGS AND HANDS  SHE WAS SEEN TWICE IN THE ED FOR SAME AND GIVEN PREDNISONE AND BENADRYL  BUT RASH CONTINUED TO SPREAD  SHE WAS RECENTLY ON CLINDAMYCIN FOR A DENTAL PROCEDURE AND HAD SOME TATTOO WORK DONE  AFTER TAKING BENADRYL SHE VOMITED  SHE HAS NO OTHER NEW FOOD OR ENVIRONMENTAL EXPOSURES  NO CHEST PAIN OR SOB  SHE IS ADMITTED TO OBSERVATION STATUS FOR ACUTE URTICARIA - NOT RESPONSIVE TO TREATMENT - IV FLUIDS, IV SOLUMEDROL 40 IV Q 8 HR, ATARAX 50 MG Q  HR, ZOFRAN PRN, CLARITIN, PEPCID, BENADRYL CREAM TO EXTREMITIES  PMH:  + TOBACCO  8/18 AFTERNOON UPDATE - PROVIDER NOTES SOME AREAS OF IMPROVEMENT ON LEGS WITH SPREADINGON TRUNK, BACK AND FACE  SUSPECTED DRUG RX TO CLINDAMYCIN  WILL KEEP TO MONITOR DEGREE OF SPREADING       ED Triage Vitals   Temperature Pulse Respirations Blood Pressure SpO2   08/17/19 1424 08/17/19 1424 08/17/19 1424 08/17/19 1424 08/17/19 1424   98 7 °F (37 1 °C) 95 16 115/83 97 %      Temp Source Heart Rate Source Patient Position - Orthostatic VS BP Location FiO2 (%)   08/17/19 1424 08/17/19 1424 08/17/19 1424 08/17/19 1424 --   Tympanic Monitor Lying Left arm       Pain Score       08/17/19 1607       4        Wt Readings from Last 1 Encounters:   08/17/19 44 6 kg (98 lb 5 2 oz)     Additional Vital Signs:   08/17/19 2357  98 1 °F (36 7 °C)  65  18  118/74  100 %  None (Room air)   08/17/19 1715  99 8 °F (37 7 °C)  85  20  122/66  98 %  None (Room air)   08/17/19 1638  --  91  22  113/64  97 %  None (Room air)   08/17/19 1607  --  89  16  110/80  98 %  None (Room air)     Pertinent Labs/Diagnostic Test Results:     Results from last 7 days   Lab Units 08/17/19  1552   WBC Thousand/uL 10 60   HEMOGLOBIN g/dL 14 7   HEMATOCRIT % 45 8   PLATELETS Thousands/uL 175   TOTAL NEUT ABS Thousand/uL 9 22*   BANDS PCT % 7     Results from last 7 days   Lab Units 08/17/19  1552   SODIUM mmol/L 133*   POTASSIUM mmol/L 4 2   CHLORIDE mmol/L 99   CO2 mmol/L 23   ANION GAP mmol/L 11   BUN mg/dL 16   CREATININE mg/dL 0 50*   EGFR ml/min/1 73sq m 130   CALCIUM mg/dL 9 2     Results from last 7 days   Lab Units 08/17/19  1552   GLUCOSE RANDOM mg/dL 186*     ED Treatment:   Medication Administration from 08/17/2019 1402 to 08/17/2019 1655    Date/Time Order Dose Route Action   08/17/2019 1551 diphenhydrAMINE (BENADRYL) injection 50 mg 50 mg Intravenous Given   08/17/2019 1605 methylPREDNISolone sodium succinate (Solu-MEDROL) injection 150 mg 150 mg Intravenous Given        Admitting Diagnosis: Rash [R21]     Age/Sex: 25 y o  female     Admission Orders:    Current Facility-Administered Medications:  diphenhydrAMINE-zinc acetate  Topical TID PRN    famotidine 20 mg Oral BID    hydrOXYzine HCL 50 mg Oral Q6H    lactated ringers 100 mL/hr Intravenous Continuous Last Rate: 100 mL/hr (08/18/19 0004)   loratadine 10 mg Oral Daily    methylPREDNISolone sodium succinate 40 mg Intravenous Q8H ARIANNA    ondansetron 4 mg Intravenous Q6H PRN [address] [facility] Utilization Review Department  Phone: [phone]; Fax [phone]  [email or web address]  ATTENTION: Please call with any questions or concerns to [phone]  and carefully listen to the prompts so that you are directed to the right person  Send all requests for admission clinical reviews, approved or denied determinations and any other requests to fax [phone]   All voicemails are confidential

## 2019-08-18 NOTE — PROGRESS NOTES
Progress Note - Venita Avila 1990, 25 y o  female MRN: [id number]    Unit/Bed#:  -01 Encounter: [id number]    Primary Care Provider: No primary care provider on file  Date and time admitted to hospital: 2019  2:19 PM        Tobacco use  Assessment & Plan  · Quarter pack a day she does not want any nicotine replacement therapy  * Acute urticaria  Assessment & Plan  · Improved on legs- to almost resolution eye swelling resolved on left - and some left over on right lower lid - although some rash spread more to trunk and back and face - suspect this is drug reaction urticaria 2/2 clindamycin will like to keep in hospital one more day on present treatment if charli shows more improvement and no more spread will dc home       VTE Pharmacologic Prophylaxis:   Pharmacologic: Pharmacologic VTE Prophylaxis contraindicated due to ambulation  Mechanical VTE Prophylaxis in Place: Yes    Patient Centered Rounds: I have performed bedside rounds with nursing staff today  Discussions with Specialists or Other Care Team Provider: nursing     Education and Discussions with Family / Patient: patient     Time Spent for Care: 30 minutes  More than 50% of total time spent on counseling and coordination of care as described above  Current Length of Stay: 0 day(s)    Current Patient Status: Observation   Certification Statement: The patient will continue to require additional inpatient hospital stay due to urticaria treatment     Discharge Plan: anticipate home charli pending progress    Code Status: Level 1 - Full Code      Subjective:   Patient seen and examined, states rash on legs almost resolved - left eye swelling resolved but it spread to more on trunk and back then yesterday and face no sob      Objective:     Vitals:   Temp (24hrs), Av 1 °F (37 3 °C), Min:98 1 °F (36 7 °C), Max:99 8 °F (37 7 °C)    Temp:  [98 1 °F (36 7 °C)-99 8 °F (37 7 °C)] 99 6 °F (37 6 °C)  HR:  [65-95] 76  Resp:  [16-22] 16  BP: (110-122)/(64-83) 116/69  SpO2:  [97 %-100 %] 97 %  Body mass index is 17 98 kg/m²  Input and Output Summary (last 24 hours): Intake/Output Summary (Last 24 hours) at 8/18/2019 0953  Last data filed at 8/18/2019 0004  Gross per 24 hour   Intake 1540 ml   Output 500 ml   Net 1040 ml       Physical Exam:     Physical Exam   Constitutional: She is oriented to person, place, and time  She appears well-developed and well-nourished  HENT:   Head: Normocephalic and atraumatic  Eyes: Pupils are equal, round, and reactive to light  EOM are normal    Neck: Normal range of motion  Cardiovascular: Normal rate, regular rhythm and normal heart sounds  Pulmonary/Chest: Effort normal and breath sounds normal    Abdominal: Soft  Bowel sounds are normal    Musculoskeletal: Normal range of motion  Neurological: She is alert and oriented to person, place, and time  She has normal reflexes  cranial nerve 2-12 are normal   Normal neurological exam   Skin: Skin is warm  Rash noted  Psychiatric: She has a normal mood and affect  Additional Data:     Labs:    Results from last 7 days   Lab Units 08/17/19  1552   WBC Thousand/uL 10 60   HEMOGLOBIN g/dL 14 7   HEMATOCRIT % 45 8   PLATELETS Thousands/uL 175   BANDS PCT % 7   LYMPHO PCT % 10*   MONO PCT % 3     Results from last 7 days   Lab Units 08/17/19  1552   SODIUM mmol/L 133*   POTASSIUM mmol/L 4 2   CHLORIDE mmol/L 99   CO2 mmol/L 23   BUN mg/dL 16   CREATININE mg/dL 0 50*   ANION GAP mmol/L 11   CALCIUM mg/dL 9 2   GLUCOSE RANDOM mg/dL 186*                           * I Have Reviewed All Lab Data Listed Above  * Additional Pertinent Lab Tests Reviewed:  Ramirez 64 Admission Reviewed    Imaging:    Imaging Reports Reviewed Today Include: none today   Imaging Personally Reviewed by Myself Includes:      Recent Cultures (last 7 days):           Last 24 Hours Medication List:     Current Facility-Administered Medications:  diphenhydrAMINE-zinc acetate  Topical TID PRN Chelo Nieto MD    famotidine 20 mg Oral BID Chelo Nieto MD    hydrOXYzine HCL 50 mg Oral Q6H Chelo Nieto MD    lactated ringers 100 mL/hr Intravenous Continuous Chelo Nieto MD Last Rate: 100 mL/hr (08/18/19 0004)   loratadine 10 mg Oral Daily Chelo Nieto MD    methylPREDNISolone sodium succinate 40 mg Intravenous [facility] Chelo Nieto MD    ondansetron 4 mg Intravenous Q6H PRN Chelo Nieto MD         Today, Patient Was Seen By: Chelo Nieto MD    ** Please Note: Dictation voice to text software may have been used in the creation of this document   **

## 2019-08-18 NOTE — NURSING NOTE
Pt resting in bed, no signs of distress  Sttes redness and hives look better and that swelling in her hands also went down  Pt has redness BL legs and arms as well as on the face  Around eyes are also swollen  IV infusing LR @ 100 ml/hr  Call bell within reach, will continue to monitor

## 2019-08-19 VITALS
DIASTOLIC BLOOD PRESSURE: 51 MMHG | WEIGHT: 98.33 LBS | HEIGHT: 62 IN | HEART RATE: 84 BPM | OXYGEN SATURATION: 96 % | RESPIRATION RATE: 20 BRPM | SYSTOLIC BLOOD PRESSURE: 95 MMHG | BODY MASS INDEX: 18.09 KG/M2 | TEMPERATURE: 98.5 F

## 2019-08-19 PROCEDURE — 99217 PR OBSERVATION CARE DISCHARGE MANAGEMENT: CPT | Performed by: FAMILY MEDICINE

## 2019-08-19 RX ORDER — FAMOTIDINE 20 MG/1
20 TABLET, FILM COATED ORAL 2 TIMES DAILY
Qty: 10 TABLET | Refills: 0 | Status: SHIPPED | OUTPATIENT
Start: 2019-08-19 | End: 2019-10-14

## 2019-08-19 RX ORDER — PREDNISONE 20 MG/1
40 TABLET ORAL DAILY
Qty: 10 TABLET | Refills: 0 | Status: SHIPPED | OUTPATIENT
Start: 2019-08-19 | End: 2019-08-24

## 2019-08-19 RX ORDER — HYDROXYZINE 50 MG/1
50 TABLET, FILM COATED ORAL 3 TIMES DAILY
Qty: 15 TABLET | Refills: 0 | Status: SHIPPED | OUTPATIENT
Start: 2019-08-19 | End: 2019-10-14

## 2019-08-19 RX ORDER — LORATADINE 10 MG/1
10 TABLET ORAL DAILY
Qty: 5 TABLET | Refills: 0 | Status: SHIPPED | OUTPATIENT
Start: 2019-08-20 | End: 2019-10-14

## 2019-08-19 RX ADMIN — HYDROXYZINE HYDROCHLORIDE 50 MG: 25 TABLET ORAL at 05:40

## 2019-08-19 RX ADMIN — METHYLPREDNISOLONE SODIUM SUCCINATE 40 MG: 40 INJECTION, POWDER, FOR SOLUTION INTRAMUSCULAR; INTRAVENOUS at 05:40

## 2019-08-19 RX ADMIN — LORATADINE 10 MG: 10 TABLET ORAL at 08:36

## 2019-08-19 RX ADMIN — FAMOTIDINE 20 MG: 20 TABLET ORAL at 08:36

## 2019-08-19 NOTE — PROGRESS NOTES
Acute urticaria   Continues to improve, legs with light redness   Urticarial rash over back trunk lightened  Eye swelling present, but eyes  are no longer reddened, possible selling from fluid? Patient spoke to about being discharged today   Will change IV steroids to prednisone 40 mg QD for 5 days upon d/c   Continue Atarax and Pepcid   Dr Cris Vargas will provide with scripts  Tobacco abuse   Admits to 0 35 ppd   Does not want nicotine replacement    VTE Pharmacologic Prophylaxis: Pharmacologic VTE Prophylaxis contraindicated due to Low risk  / sequential compression device       Patient Centered Rounds: I have performed bedside rounds with nursing staff today  Discussions with Specialists or Other Care Team Provider: Nursing    Education and Discussions with Family / Patient: Patient    Time Spent for Care: More than 50% of total time spent on counseling and coordination of care as described above  Current Length of Stay: 0 day(s)    Current Patient Status: Observation     Discharge Plan: Discharge today     Code Status: Level 1 - Full Code      Subjective:   Pt seen and observed this morning in bed  Her rash is much improved and patient states that she is "feeling better"  Patient was informed that she will be discharged today  Care was dicussed as far as the medications she will be on and her f/u with a doctor outpatient  Objective:     Vitals:   Temp (24hrs), Av 4 °F (37 4 °C), Min:98 5 °F (36 9 °C), Max:100 1 °F (37 8 °C)    Temp:  [98 5 °F (36 9 °C)-100 1 °F (37 8 °C)] 98 5 °F (36 9 °C)  HR:  [83-97] 84  Resp:  [18-20] 20  BP: ()/(49-59) 95/51  SpO2:  [96 %-98 %] 96 %  Body mass index is 17 98 kg/m²  Input and Output Summary (last 24 hours):        Intake/Output Summary (Last 24 hours) at 2019 0915  Last data filed at 2019 1700  Gross per 24 hour   Intake 450 ml   Output --   Net 450 ml       Physical Exam:     Physical Exam   Constitutional: She is oriented to person, place, and time  She appears well-developed and well-nourished  thin appearing   HENT:   Head: Normocephalic  Eyes: Pupils are equal, round, and reactive to light  Conjunctivae are normal  Right conjunctiva is not injected  Left conjunctiva is not injected  Periorbital edema b/l   Neck: Neck supple  Cardiovascular: Normal rate, regular rhythm and normal heart sounds  Pulmonary/Chest: Effort normal and breath sounds normal    Abdominal: Soft  Bowel sounds are normal    Musculoskeletal: Normal range of motion  Neurological: She is alert and oriented to person, place, and time  Skin: Skin is warm and dry  Psychiatric: She has a normal mood and affect  Her behavior is normal  Judgment and thought content normal        Additional Data:     Labs:    Results from last 7 days   Lab Units 08/17/19  1552   WBC Thousand/uL 10 60   HEMOGLOBIN g/dL 14 7   HEMATOCRIT % 45 8   PLATELETS Thousands/uL 175   BANDS PCT % 7   LYMPHO PCT % 10*   MONO PCT % 3     Results from last 7 days   Lab Units 08/17/19  1552   SODIUM mmol/L 133*   POTASSIUM mmol/L 4 2   CHLORIDE mmol/L 99   CO2 mmol/L 23   BUN mg/dL 16   CREATININE mg/dL 0 50*   ANION GAP mmol/L 11   CALCIUM mg/dL 9 2   GLUCOSE RANDOM mg/dL 186*                           * I Have Reviewed All Lab Data Listed Above  * Additional Pertinent Lab Tests Reviewed:  All Labs Within Last 24 Hours Reviewed    Imaging:    Imaging Reports Reviewed Today Include: None  Imaging Personally Reviewed by Myself Includes:  None    Recent Cultures (last 7 days):           Last 24 Hours Medication List:     Current Facility-Administered Medications:  diphenhydrAMINE-zinc acetate  Topical TID PRN Monica Kwan MD   famotidine 20 mg Oral BID Monica Kwan MD   hydrOXYzine HCL 50 mg Oral Q6H Monica Kwan MD   loratadine 10 mg Oral Daily Monica Kwan MD   methylPREDNISolone sodium succinate 40 mg Intravenous [facility] Monica Kwan MD   ondansetron 4 mg Intravenous Q6H PRN Jairon Cesar MD        Today, Patient Was Seen By: FADI Butler    ** Please Note: Dictation voice to text software may have been used in the creation of this document   **

## 2019-08-19 NOTE — ASSESSMENT & PLAN NOTE
· With angioedema secondary to clindamycin  Suspect late response aortic area improved at resolved on the face  The angioedema is down she had a swelling of the eyelids the redness is gone the top eyelid is resolved the lower 1 still swollen but not worsened  She has no airway compromise she has been eating satting well  Her hand edema is down was only on the left  Discussed with her will discuss charge home on prednisone 40 mg daily for 5 days Pepcid 20 mg p  O  B i d  For 5 days Claritin 10 mg daily in the morning for 5 days and Atarax 50 mg every 8 hours for 5 more days  This will continue to improve  Also discussed with her follow-up in a week and a care with [facility]  Also avoid citrus fruits and milk right now and drink plenty of fluids to clear out the toxins

## 2019-08-19 NOTE — DISCHARGE SUMMARY
Discharge- Alexandre Loredo 1990, 25 y o  female MRN: [id number]    Unit/Bed#: 5T -01 Encounter: [id number]    Primary Care Provider: No primary care provider on file  Date and time admitted to hospital: 8/17/2019  2:19 PM        Tobacco use  Assessment & Plan  · Quarter pack a day she does not want any nicotine replacement therapy  * Acute urticaria  Assessment & Plan  · With angioedema secondary to clindamycin  Suspect late response aortic area improved at resolved on the face  The angioedema is down she had a swelling of the eyelids the redness is gone the top eyelid is resolved the lower 1 still swollen but not worsened  She has no airway compromise she has been eating satting well  Her hand edema is down was only on the left  Discussed with her will discuss charge home on prednisone 40 mg daily for 5 days Pepcid 20 mg p  O  B i d  For 5 days Claritin 10 mg daily in the morning for 5 days and Atarax 50 mg every 8 hours for 5 more days  This will continue to improve  Also discussed with her follow-up in a week and a care with [facility]  Also avoid citrus fruits and milk right now and drink plenty of fluids to clear out the toxins  Discharging Physician / Practitioner: Sherly Ramirez MD  PCP: No primary care provider on file    Admission Date:   Admission Orders (From admission, onward)     Ordered        08/17/19 1600  Place in Observation (expected length of stay for this patient is less than two midnights)  Once                   Discharge Date: 08/19/19    Resolved Problems  Date Reviewed: 8/19/2019    None          Consultations During Hospital Stay:  · None    Procedures Performed:   · None    Significant Findings / Test Results:   · None    Incidental Findings:   · None     Test Results Pending at Discharge (will require follow up):   · none     Outpatient Tests Requested:  · none    Complications:  None    Reason for Admission:  Diffuse rash and swelling of the eyelids and left hand    Hospital Course:     Pato Smith is a 25 y o  female patient who originally presented to the hospital on 8/17/2019 due to acute UT carry a with has some angioedema of the eyelids and left hand  Suspect secondary to clindamycin pretty severe drug reaction which did not respond to outpatient treatment she was taking clindamycin for 8 days for dental procedure and is started after the a day  There was no other sources to contribute  She was treated twice from the ER as an outpatient but her space started swelling and that is why she came in she was admitted started on high-dose IV steroids and cataracts and H2 blockers as well  Her your take area continue to improve tremendously her erythema of the eyelids were resolved her upper eyelid swelling has resolved lower eyelid has not worsened her left hand has improved  There was no respiratory compromise associated  Patient was medically clear to be discharged home to continue outpatient treatment for 5 more days as prescribed  Please see above list of diagnoses and related plan for additional information  Condition at Discharge: stable     Discharge Day Visit / Exam:     Subjective:  Patient seen and examined the rash is better there is no nausea no vomiting just states the lower eyelids still swollen and left hand swelling has improved  Vitals: Blood Pressure: 95/51 (08/19/19 0746)  Pulse: 84 (08/19/19 0746)  Temperature: 98 5 °F (36 9 °C) (08/19/19 0746)  Temp Source: Temporal (08/19/19 0746)  Respirations: 20 (08/19/19 0746)  Height: 5' 2" (157 5 cm) (08/17/19 1715)  Weight - Scale: 44 6 kg (98 lb 5 2 oz) (08/17/19 1715)  SpO2: 96 % (08/19/19 0746)  Exam:   Physical Exam   Constitutional: She is oriented to person, place, and time  She appears well-developed and well-nourished  HENT:   Head: Normocephalic and atraumatic  Eyes: Pupils are equal, round, and reactive to light   EOM are normal    Upper eyelid swelling is down lower eyelid swelling has not worsened mildly improved there is no erythema surrounding the eyelids   Neck: Normal range of motion  Cardiovascular: Normal rate, regular rhythm and normal heart sounds  Pulmonary/Chest: Effort normal and breath sounds normal    Abdominal: Soft  Bowel sounds are normal    Musculoskeletal: Normal range of motion  She exhibits edema (Left upper extremity edema is gone will left hand improved)  Neurological: She is alert and oriented to person, place, and time  She has normal reflexes  cranial nerve 2-12 are normal   Normal neurological exam   Skin: Skin is warm  Rash (Diffuse urticaria rash tremendous improvement) noted  Psychiatric: She has a normal mood and affect  Discussion with Family: patient     Discharge instructions/Information to patient and family:   See after visit summary for information provided to patient and family  Provisions for Follow-Up Care:  See after visit summary for information related to follow-up care and any pertinent home health orders  Disposition:     Home    For Discharges to [facility] SNF:   · Not Applicable to this Patient - Not Applicable to this Patient    Planned Readmission: no      Discharge Statement:  I spent >35 minutes discharging the patient  This time was spent on the day of discharge  I had direct contact with the patient on the day of discharge  Greater than 50% of the total time was spent examining patient, answering all patient questions, arranging and discussing plan of care with patient as well as directly providing post-discharge instructions  Additional time then spent on discharge activities  Discharge Medications:  See after visit summary for reconciled discharge medications provided to patient and family        ** Please Note: This note has been constructed using a voice recognition system **

## 2019-08-19 NOTE — NURSING NOTE
Pt discharged, IV removed  Belongings accounted for  AVS reviewed with pt, questions answered and understanding stated  Prescriptions provided  Pt ambulated out with nursing staff

## 2019-08-19 NOTE — NURSING NOTE
Dr Nila Burrell in to see patient  Believes it was an infiltrated IV, no new orders  Will continue to monitor

## 2019-08-19 NOTE — DISCHARGE INSTRUCTIONS
Urticaria   AMBULATORY CARE:   Urticaria  is also called hives  Hives can change size and shape, and appear anywhere on your skin  They can be mild or severe and last from a few minutes to a few days  Hives may be a sign of a severe allergic reaction called anaphylaxis that needs immediate treatment  Urticaria that lasts longer than 6 weeks may be a chronic condition that needs long-term treatment  Call 911 for signs or symptoms of anaphylaxis,  such as trouble breathing, swelling in your mouth or throat, or wheezing  You may also have itching, a rash, or feel like you are going to faint  Seek care immediately if:   · Your heart is beating faster than it normally does  · You have cramping or severe pain in your abdomen  Contact your healthcare provider if:   · You have a fever  · Your skin still itches 24 hours after you take your medicine  · You still have hives after 7 days  · Your joints are painful and swollen  · You have questions or concerns about your condition or care  Steps to take for signs or symptoms of anaphylaxis:   · Immediately  give 1 shot of epinephrine only into the outer thigh muscle  · Leave the shot in place  as directed  Your healthcare provider may recommend you leave it in place for up to 10 seconds before you remove it  This helps make sure all of the epinephrine is delivered  · Call 911 and go to the emergency department,  even if the shot improved symptoms  Do not drive yourself  Bring the used epinephrine shot with you  Treatment for mild urticaria  may not be needed  Chronic urticaria may need to be treated with more than one medicine, or other medicines than listed below  The following are common medicines used to treat urticaria:  · Antihistamines  decrease mild symptoms such as itching or a rash  · Steroids  decrease redness, pain, and swelling  · Epinephrine  is used to treat severe allergic reactions such as anaphylaxis    Safety precautions to take if you are at risk for anaphylaxis:   · Keep 2 shots of epinephrine with you at all times  You may need a second shot, because epinephrine only works for about 20 minutes and symptoms may return  Your healthcare provider can show you and family members how to give the shot  Check the expiration date every month and replace it before it expires  · Create an action plan  Your healthcare provider can help you create a written plan that explains the allergy and an emergency plan to treat a reaction  The plan explains when to give a second epinephrine shot if symptoms return or do not improve after the first  Give copies of the action plan and emergency instructions to family members, work and school staff, and  providers  Show them how to give a shot of epinephrine  · Be careful when you exercise  If you have had exercise-induced anaphylaxis, do not exercise right after you eat  Stop exercising right away if you start to develop any signs or symptoms of anaphylaxis  You may first feel tired, warm, or have itchy skin  Hives, swelling, and severe breathing problems may develop if you continue to exercise  · Carry medical alert identification  Wear medical alert jewelry or carry a card that explains the allergy  Ask your healthcare provider where to get these items  · Keep a record of triggers and symptoms  Record everything you eat, drink, or apply to your skin for 3 weeks  Include stressful events and what you were doing right before your hives started  Bring the record with you to follow-up visits with your healthcare provider  Manage urticaria:   · Cool your skin  This may help decrease itching  Apply a cool pack to your hives  Dip a hand towel in cool water, wring it out, and place it on your hives  You may also soak your skin in a cool oatmeal bath  · Do not rub your hives  This can irritate your skin and cause more hives  · Wear loose clothing    Tight clothes may irritate your skin and cause more hives  · Manage stress  Stress may trigger hives, or make them worse  Learn new ways to relax, such as deep breathing  Follow up with your healthcare provider as directed:  Write down your questions so you remember to ask them during your visits  © 2017 [employer] Information is for End User's use only and may not be sold, redistributed or otherwise used for commercial purposes  All illustrations and images included in CareNotes® are the copyrighted property of [employer]  or [employer]  The above information is an  only  It is not intended as medical advice for individual conditions or treatments  Talk to your doctor, nurse or pharmacist before following any medical regimen to see if it is safe and effective for you  General Allergic Reaction   AMBULATORY CARE:   A general allergic reaction  is your body's response to an allergen  Allergens include medicines, food, insect stings, animal dander, mold, latex, chemicals, and dust mites  Pollen from trees, grass, and weeds can also cause an allergic reaction  Seek care immediately if:   · You have a skin rash, hives, swelling, or itching that gets worse  · You have trouble breathing, shortness of breath, wheezing, or coughing  · Your throat tightens, or your lips or tongue swell  · You have trouble swallowing or speaking  · You have dizziness, lightheadedness, fainting, or confusion  · You have nausea, vomiting, diarrhea, or abdominal cramps  · You have chest pain or tightness  Contact your healthcare provider if:   · You have questions or concerns about your condition or care  Treatment for a general allergic reaction  may include medicines to relieve certain allergy symptoms such as itching, sneezing, and swelling  You may take them as a pill or use drops in your nose or eyes   Topical treatments may be given to put directly on your skin to help decrease itching or swelling  Manage allergic reactions:   · Avoid the allergen  that you think may have caused your allergic reaction  · Use cold compresses  on your skin or eyes if they were affected by the allergic reaction  Cold compresses may help to soothe your skin or eyes  · Rinse your nasal passages  with a saline solution  Daily rinsing may help clear your nose of allergens  · Do not smoke  Your allergy symptoms may decrease if you are not around smoke  Nicotine and other chemicals in cigarettes and cigars can also cause lung damage  Ask your healthcare provider for information if you currently smoke and need help to quit  E-cigarettes or smokeless tobacco still contain nicotine  Talk to your healthcare provider before you use these products  Follow up with your healthcare provider as directed:  Write down your questions so you remember to ask them during your visits  © 2017 [employer] Information is for End User's use only and may not be sold, redistributed or otherwise used for commercial purposes  All illustrations and images included in CareNotes® are the copyrighted property of [employer]  or [employer]  The above information is an  only  It is not intended as medical advice for individual conditions or treatments  Talk to your doctor, nurse or pharmacist before following any medical regimen to see if it is safe and effective for you

## 2019-08-19 NOTE — SOCIAL WORK
SW met with pt to complete observation notification  Pt planned for discharge home today  No needs identified

## 2019-08-19 NOTE — NURSING NOTE
Pt has increased swelling in L extremity  She states it was not this far up her arm before, only in her hand/wrist  No pain or redness  Skin is warm, pale and taught  IV d/c'd in that arm  Hospitalist has been paged

## 2019-10-14 ENCOUNTER — HOSPITAL ENCOUNTER (EMERGENCY)
Facility: HOSPITAL | Age: 29
Discharge: HOME | End: 2019-10-14
Attending: EMERGENCY MEDICINE
Payer: COMMERCIAL

## 2019-10-14 VITALS
RESPIRATION RATE: 18 BRPM | DIASTOLIC BLOOD PRESSURE: 73 MMHG | HEART RATE: 91 BPM | WEIGHT: 104.28 LBS | SYSTOLIC BLOOD PRESSURE: 126 MMHG | OXYGEN SATURATION: 100 % | TEMPERATURE: 97 F | BODY MASS INDEX: 19.07 KG/M2

## 2019-10-14 DIAGNOSIS — L03.90 CELLULITIS: Primary | ICD-10-CM

## 2019-10-14 PROCEDURE — 99284 EMERGENCY DEPT VISIT MOD MDM: CPT | Performed by: PHYSICIAN ASSISTANT

## 2019-10-14 PROCEDURE — 99283 EMERGENCY DEPT VISIT LOW MDM: CPT

## 2019-10-14 RX ORDER — CEPHALEXIN 500 MG/1
500 CAPSULE ORAL EVERY 8 HOURS SCHEDULED
Qty: 21 CAPSULE | Refills: 0 | Status: SHIPPED | OUTPATIENT
Start: 2019-10-14 | End: 2019-10-21

## 2019-10-17 NOTE — ED PROVIDER NOTES
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 Taking?   desogestrel-ethinyl estradiol (APRI) 0 15-30 MG-MCG per tablet   Yes No   Sig: Take 1 tablet by mouth daily   diphenhydrAMINE-zinc acetate (BENADRYL) cream Not Taking at Unknown time  No No   Sig: Apply topically 3 (three) times a day as needed for itching   Patient not taking: Reported on 10/14/2019      Facility-Administered Medications: None       History reviewed  No pertinent past medical history  History reviewed  No pertinent surgical history  History reviewed  No pertinent family history  I have reviewed and agree with the history as documented  Social History     Tobacco Use    Smoking status: Current Every Day Smoker     Packs/day: 0 25    Smokeless tobacco: Never Used   Substance Use Topics    Alcohol use: Yes     Frequency: Never     Comment: rare    Drug use: Never        Review of Systems   Constitutional: Negative  HENT: Negative  Eyes: Negative  Respiratory: Negative  Cardiovascular: Negative  Gastrointestinal: Negative  Endocrine: Negative  Genitourinary: Negative  Musculoskeletal: Negative  Skin: Positive for wound  Allergic/Immunologic: Negative  Neurological: Negative  Hematological: Negative      Psychiatric/Behavioral: Negative  Physical Exam  Physical Exam   Constitutional: She appears well-developed and well-nourished  HENT:   Mouth/Throat: Oropharynx is clear and moist  No oropharyngeal exudate  Eyes: Conjunctivae are normal    Cardiovascular: Normal rate, regular rhythm and normal heart sounds  Pulmonary/Chest: Effort normal and breath sounds normal    Skin: Skin is warm  Capillary refill takes less than 2 seconds  1cm Large, superficial ulceration on thenar eminence of R palmar side  Slightly erythematous  No drainage  No induration  No abscess or mass present  Psychiatric: She has a normal mood and affect  Her behavior is normal  Judgment and thought content normal        Vital Signs  ED Triage Vitals [10/14/19 1632]   Temperature Pulse Respirations Blood Pressure SpO2   (!) 97 °F (36 1 °C) 91 18 126/73 100 %      Temp src Heart Rate Source Patient Position - Orthostatic VS BP Location FiO2 (%)   -- Monitor Sitting Left arm --      Pain Score       No Pain           Vitals:    10/14/19 1632   BP: 126/73   Pulse: 91   Patient Position - Orthostatic VS: Sitting         Visual Acuity      ED Medications  Medications - No data to display    Diagnostic Studies  Results Reviewed     None                 No orders to display              Procedures  Procedures       ED Course                               MDM  Number of Diagnoses or Management Options  Cellulitis:   Diagnosis management comments: Provided patient with antibiotic Rx  I reviewed strict indications with the patient on if and when to return to the eR  No mucosal membrane involvement suggestive of TENs/nga juan carlos syndrome  No fever  Patient d/c home stable         Disposition  Final diagnoses:   Cellulitis     Time reflects when diagnosis was documented in both MDM as applicable and the Disposition within this note     Time User Action Codes Description Comment    10/14/2019  4:46 PM Salvador Shannon [L03 90] Cellulitis       ED Disposition ED Disposition Condition Date/Time Comment    Discharge Stable Mon Oct 14, 2019  4:46 PM Tamie Miser discharge to home/self care  Follow-up Information    None         Discharge Medication List as of 10/14/2019  4:50 PM      START taking these medications    Details   cephalexin (KEFLEX) 500 mg capsule Take 1 capsule (500 mg total) by mouth every 8 (eight) hours for 7 days, Starting Mon 10/14/2019, Until Mon 10/21/2019, Print         CONTINUE these medications which have NOT CHANGED    Details   desogestrel-ethinyl estradiol (APRI) 0 15-30 MG-MCG per tablet Take 1 tablet by mouth daily, Starting Tue 4/16/2019, Historical Med      diphenhydrAMINE-zinc acetate (BENADRYL) cream Apply topically 3 (three) times a day as needed for itching, Starting Fri 8/16/2019, Print           No discharge procedures on file      ED Provider  Electronically Signed by           Rebecca Borrego PA-C  10/16/19 3714

## 2019-10-18 NOTE — ED ATTENDING ATTESTATION
I was the attending physician on duty at the time the patient visited the emergency department  The patient was evaluated and dispositioned by the APC  I was personally available for consultation  I am administratively signing the chart after the fact      Kori Martínez MD

## 2019-12-07 ENCOUNTER — APPOINTMENT (EMERGENCY)
Dept: RADIOLOGY | Facility: HOSPITAL | Age: 29
End: 2019-12-07
Payer: COMMERCIAL

## 2019-12-07 ENCOUNTER — HOSPITAL ENCOUNTER (EMERGENCY)
Facility: HOSPITAL | Age: 29
Discharge: HOME | End: 2019-12-07
Attending: EMERGENCY MEDICINE | Admitting: EMERGENCY MEDICINE
Payer: COMMERCIAL

## 2019-12-07 VITALS
TEMPERATURE: 97.6 F | HEART RATE: 98 BPM | WEIGHT: 102.95 LBS | OXYGEN SATURATION: 100 % | BODY MASS INDEX: 18.83 KG/M2 | DIASTOLIC BLOOD PRESSURE: 86 MMHG | SYSTOLIC BLOOD PRESSURE: 115 MMHG | RESPIRATION RATE: 16 BRPM

## 2019-12-07 DIAGNOSIS — S60.221A CONTUSION OF RIGHT HAND, INITIAL ENCOUNTER: Primary | ICD-10-CM

## 2019-12-07 LAB
EXT PREG TEST URINE: NEGATIVE
EXT. CONTROL ED NAV: NORMAL

## 2019-12-07 PROCEDURE — 99283 EMERGENCY DEPT VISIT LOW MDM: CPT | Performed by: EMERGENCY MEDICINE

## 2019-12-07 PROCEDURE — 99283 EMERGENCY DEPT VISIT LOW MDM: CPT

## 2019-12-07 PROCEDURE — 73130 X-RAY EXAM OF HAND: CPT

## 2019-12-07 PROCEDURE — 81025 URINE PREGNANCY TEST: CPT | Performed by: EMERGENCY MEDICINE

## 2019-12-07 RX ORDER — IBUPROFEN 600 MG/1
600 TABLET ORAL ONCE
Status: COMPLETED | OUTPATIENT
Start: 2019-12-07 | End: 2019-12-07

## 2019-12-07 RX ADMIN — IBUPROFEN 600 MG: 600 TABLET ORAL at 13:18

## 2019-12-07 NOTE — ED PROVIDER NOTES
History  Chief Complaint   Patient presents with    Hand Injury     states she banged her rt hand off of the bathtub last night  having pain to the same     78-year-old female presents for evaluation right hand pain  Patient reports hitting her hand on the bathtub yesterday where her arm slipped while she was picking up her baby  Reports taking Motrin yesterday with mild relief  Pain is worse with palpation  Neurovascularly intact distally  Prior to Admission Medications   Prescriptions Last Dose Informant Patient Reported? Taking?   desogestrel-ethinyl estradiol (APRI) 0 15-30 MG-MCG per tablet   Yes Yes   Sig: Take 1 tablet by mouth daily   diphenhydrAMINE-zinc acetate (BENADRYL) cream   No Yes   Sig: Apply topically 3 (three) times a day as needed for itching      Facility-Administered Medications: None       History reviewed  No pertinent past medical history  Past Surgical History:   Procedure Laterality Date     SECTION         History reviewed  No pertinent family history  I have reviewed and agree with the history as documented  Social History     Tobacco Use    Smoking status: Current Every Day Smoker     Packs/day: 0 25    Smokeless tobacco: Never Used   Substance Use Topics    Alcohol use: Yes     Frequency: Never     Comment: rare    Drug use: Never        Review of Systems   Musculoskeletal: Positive for arthralgias  Right hand pain   Skin: Positive for color change  Bruising of hand       Physical Exam  Physical Exam   Constitutional: She appears well-developed and well-nourished  Musculoskeletal: Normal range of motion  She exhibits edema and tenderness  Right hand with mild edema around hypo thenar eminence with ecchymosis  Tender to palpation  Full range of motion  Nontender around wrists  Normal  strength  Neurovascularly intact distally  Normal cap refill  Neurological: No sensory deficit  She exhibits normal muscle tone         Vital Signs  ED Triage Vitals [12/07/19 1253]   Temperature Pulse Respirations Blood Pressure SpO2   97 6 °F (36 4 °C) 98 16 115/86 100 %      Temp Source Heart Rate Source Patient Position - Orthostatic VS BP Location FiO2 (%)   Tympanic Monitor Sitting Left arm --      Pain Score       7           Vitals:    12/07/19 1253   BP: 115/86   Pulse: 98   Patient Position - Orthostatic VS: Sitting         Visual Acuity      ED Medications  Medications   ibuprofen (MOTRIN) tablet 600 mg (600 mg Oral Given 12/7/19 1318)       Diagnostic Studies  Results Reviewed     Procedure Component Value Units Date/Time    POCT pregnancy, urine [[id number]]  (Normal) Resulted:  12/07/19 1313    Lab Status:  Final result Updated:  12/07/19 1313     EXT PREG TEST UR (Ref: Negative) negative     Control valid                 XR hand 3+ views RIGHT   ED Interpretation by Gonzalo Chen DO (12/07 1351)   No acute osseous abnormality                 Procedures  Procedures         ED Course                               MDM  Number of Diagnoses or Management Options  Contusion of right hand, initial encounter:   Diagnosis management comments: 52-year-old female presenting with right hand injury  Obtain x-ray, pain control as needed  Disposition  Final diagnoses:   Contusion of right hand, initial encounter     Time reflects when diagnosis was documented in both MDM as applicable and the Disposition within this note     Time User Action Codes Description Comment    12/7/2019  1:51 PM Arnold Celis Add [N31 275L] Contusion of right hand, initial encounter       ED Disposition     ED Disposition Condition Date/Time Comment    Discharge Stable Sat Dec 7, 2019  1:51 PM Nimco Guardian discharge to home/self care  Follow-up Information     Follow up With Specialties Details Why Ligia Gerard MD Family Medicine In 1 week  [address]  [phone]      [facility] Emergency Medicine  If symptoms worsen [id number] [facility] [id number] [phone]          Discharge Medication List as of 12/7/2019  1:51 PM      CONTINUE these medications which have NOT CHANGED    Details   desogestrel-ethinyl estradiol (APRI) 0 15-30 MG-MCG per tablet Take 1 tablet by mouth daily, Starting Tue 4/16/2019, Historical Med      diphenhydrAMINE-zinc acetate (BENADRYL) cream Apply topically 3 (three) times a day as needed for itching, Starting Fri 8/16/2019, Print           No discharge procedures on file      ED Provider  Electronically Signed by           Raj Yanes DO  12/07/19 6616

## 2019-12-10 ENCOUNTER — OFFICE VISIT (OUTPATIENT)
Dept: OBGYN CLINIC | Facility: CLINIC | Age: 29
End: 2019-12-10
Payer: COMMERCIAL

## 2019-12-10 VITALS — BODY MASS INDEX: 18.66 KG/M2 | WEIGHT: 102 LBS

## 2019-12-10 DIAGNOSIS — S62.346A CLOSED NONDISPLACED FRACTURE OF BASE OF FIFTH METACARPAL BONE OF RIGHT HAND, INITIAL ENCOUNTER: Primary | ICD-10-CM

## 2019-12-10 PROCEDURE — 29125 APPL SHORT ARM SPLINT STATIC: CPT | Performed by: ORTHOPAEDIC SURGERY

## 2019-12-10 PROCEDURE — 99203 OFFICE O/P NEW LOW 30 MIN: CPT | Performed by: ORTHOPAEDIC SURGERY

## 2019-12-10 NOTE — LETTER
December 10, 2019     Patient: Katty Meehan   YOB: 1990   Date of Visit: 12/10/2019       To Whom it May Concern:    Katty Meehan is under my professional care  She was seen in my office on 12/10/2019  She has a fracture of her right 5th metacarpal which I placed in a short-arm splint today  This will remain on for approximately 3 weeks  She may continue working at [employer] duty only with no lifting with this right arm  I will progress her work activities when the fracture is healed  If you have any questions or concerns, please don't hesitate to call           Sincerely,          Mitali Rizvi MD        CC: No Recipients

## 2019-12-10 NOTE — PATIENT INSTRUCTIONS
Plan :   I explained to  the patient she has fractured the base of her right 5th metacarpal in her hand and this will heal without surgery  She needs to be immobilized for 2-3 weeks to allow the fracture to heal   I put her in a ulnar gutter splint today which will stay on full-time for that period of time  I do not want her taking the splint off  She must keep it dry and I gave her cast care instructions noted below  I put her on light duty at work and she should not lift use this arm to lift anything heavy  I will see her in 2 weeks for splint off, repeat x-ray, beginning of range of motion  CAST INSTRUCTIONS        1  Elevate cast next 3-4 days  Cast should be above level of your heart to allow blood to drain back  Wiggle-wiggle fingers or toes, even if it is slightly painful, to prevent stiffness  2  Apply ice in a large trash bag [or bag of frozen peas] to injured area for fifteen minutes, four times a day for 3-4 days to help decrease swelling  3  DO NOT put anything down inside the cast to scratch  This will likely cause infection  If you feel itching, use a blow dryer on a cold setting to blow air over the casted area  NO POWDER, either  4  DO NOT cut or alter the cast in any way  This is dangerous  5  DO NOT get the cast wet! Sponge baths or bathe with the cast out of the tub  Do not use plastic bags as they often leak  6  Some swelling and black-and-blue discoloration is normal   It is not normal to have pins-and-needles, loss of feeling, rubbing, or white skin  If these happen, call your doctor or go immediately to the Emergency Room  If you havent already, be certain to call the office to make an appointment in _14_ days

## 2019-12-10 NOTE — PROGRESS NOTES
Chief Complaint   Patient presents with    Right Hand - Follow-up           Assessment:  Fracture right 5th metacarpal base     Plan :  I explained to  the patient she has fractured the base of her right 5th metacarpal in her hand and this will heal without surgery  She needs to be immobilized for 2-3 weeks to allow the fracture to heal   I put her in a ulnar gutter splint today which will stay on full-time for that period of time  I do not want her taking the splint off  She must keep it dry and I gave her cast care instructions noted below  I put her on light duty at work and she should not lift use this arm to lift anything heavy  I will see her in 2 weeks for splint off, repeat x-ray, beginning of range of motion  CAST INSTRUCTIONS        1  Elevate cast next 3-4 days  Cast should be above level of your heart to allow blood to drain back  Wiggle-wiggle fingers or toes, even if it is slightly painful, to prevent stiffness  2  Apply ice in a large trash bag [or bag of frozen peas] to injured area for fifteen minutes, four times a day for 3-4 days to help decrease swelling  3  DO NOT put anything down inside the cast to scratch  This will likely cause infection  If you feel itching, use a blow dryer on a cold setting to blow air over the casted area  NO POWDER, either  4  DO NOT cut or alter the cast in any way  This is dangerous  5  DO NOT get the cast wet! Sponge baths or bathe with the cast out of the tub  Do not use plastic bags as they often leak  6  Some swelling and black-and-blue discoloration is normal   It is not normal to have pins-and-needles, loss of feeling, rubbing, or white skin  If these happen, call your doctor or go immediately to the Emergency Room  If you havent already, be certain to call the office to make an appointment in _14_ days      HPI:   This is a 78-year-old white female presenting for orthopedic evaluation regarding her right hand injury sustained 2019  She was given her daughter a bath when she hit her hand on the side of the tab  She is right-handed  She has not previously having issues with this hand  Her pain is localized along the 5th metacarpal   She presented to the ED the following day on 2019 where x-rays were performed  These were read as negative and she was discharged  She states she was given a call the following day and informed that there may be a questionable hairline fracture at the 5th metacarpal   She has been wrapping, icing and taking ibuprofen on her own at home  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  She completed one day of work since her injury without issue  PMHx:         History reviewed  No pertinent past medical history  Past Surgical History:   Procedure Laterality Date     SECTION         History reviewed  No pertinent family history      Social History     Socioeconomic History    Marital status: Single     Spouse name: Not on file    Number of children: Not on file    Years of education: Not on file    Highest education level: Not on file   Occupational History    Not on file   Social Needs    Financial resource strain: Not on file    Food insecurity:     Worry: Not on file     Inability: Not on file    Transportation needs:     Medical: Not on file     Non-medical: Not on file   Tobacco Use    Smoking status: Current Every Day Smoker     Packs/day: 0 25    Smokeless tobacco: Never Used   Substance and Sexual Activity    Alcohol use: Yes     Frequency: Never     Comment: rare    Drug use: Never    Sexual activity: Not on file   Lifestyle    Physical activity:     Days per week: Not on file     Minutes per session: Not on file    Stress: Not on file   Relationships    Social connections:     Talks on phone: Not on file     Gets together: Not on file     Attends Zoroastrianism service: Not on file     Active member of club or organization: Not on file Attends meetings of clubs or organizations: Not on file     Relationship status: Not on file    Intimate partner violence:     Fear of current or ex partner: Not on file     Emotionally abused: Not on file     Physically abused: Not on file     Forced sexual activity: Not on file   Other Topics Concern    Not on file   Social History Narrative    Not on file       Current Outpatient Medications   Medication Sig Dispense Refill    desogestrel-ethinyl estradiol (APRI) 0 15-30 MG-MCG per tablet Take 1 tablet by mouth daily      diphenhydrAMINE-zinc acetate (BENADRYL) cream Apply topically 3 (three) times a day as needed for itching 28 3 g 0     No current facility-administered medications for this visit  Allergies: Benadryl [diphenhydramine] and Clindamycin    ROS:  Positive for orthopedic complaints noted above  The remaining 11/12 systems on the intake sheet that I reviewed were negative  PE:  Wt 46 3 kg (102 lb)   LMP 11/23/2019 (Approximate)   BMI 18 66 kg/m²   Constitutional: The patient was  oriented to person, place, and time  She was thin, poor dental health  In no acute distress  HEENT: Vision intact  Hearing normal  Swallowing normal   Head: Normocephalic  Cardiovascular: Intact distal pulses  Pulse regular  Pulmonary/Chest: Effort normal  No respiratory distress  Neurological: Alert and oriented to person, place, and time  Skin: Skin is warm  Psychiatric: Normal mood and affect  Ortho Exam:  On today's exam of the right hand, she was not wearing any splints or bandage  There was some swelling and ecchymosis of the hand  She was tender to palpation at the proximal 3rd of the 5th metacarpal   She is able to make a full composite fist   Andujar pinch  was 5/5  She can oppose the thumb to the small finger  Wrist range of motion was intact  Sensation is intact to light touch and good capillary refill in all fingers  Distal radial pulse was present    There is no antecubital adenopathy or cellulitis noted  Studies reviewed:  I personally reviewed right hand x-rays as well as the radiologist's report  There is a nondisplaced fracture of the base of the right 5th metacarpal   The metacarpal shaft is not displaced from the fracture  Splint application  Date/Time: 12/10/2019 9:41 AM  Performed by: Ling Kulkarni MD  Authorized by: Ling Kulkarni MD     Consent:     Consent obtained:  Verbal    Consent given by:  Patient  Pre-procedure details:     Sensation:  Normal  Procedure details:     Laterality:  Right    Location:  Hand    Hand:  R hand    Splint type:  Ulnar gutter    Supplies:  Ortho-Glass  Post-procedure details:     Patient tolerance of procedure:   Tolerated well, no immediate complications          Scribe Attestation    I,:   Iban Bear MA am acting as a scribe while in the presence of the attending physician :        I,:   Ling Kulkarni MD personally performed the services described in this documentation    as scribed in my presence :

## 2019-12-24 ENCOUNTER — HOSPITAL ENCOUNTER (OUTPATIENT)
Dept: RADIOLOGY | Facility: HOSPITAL | Age: 29
Discharge: HOME | End: 2019-12-24
Attending: ORTHOPAEDIC SURGERY
Payer: COMMERCIAL

## 2019-12-24 ENCOUNTER — OFFICE VISIT (OUTPATIENT)
Dept: OBGYN CLINIC | Facility: CLINIC | Age: 29
End: 2019-12-24
Payer: COMMERCIAL

## 2019-12-24 VITALS — WEIGHT: 102 LBS | BODY MASS INDEX: 18.77 KG/M2 | HEIGHT: 62 IN

## 2019-12-24 DIAGNOSIS — S62.346D CLOSED NONDISPLACED FRACTURE OF BASE OF FIFTH METACARPAL BONE OF RIGHT HAND WITH ROUTINE HEALING, SUBSEQUENT ENCOUNTER: Primary | ICD-10-CM

## 2019-12-24 DIAGNOSIS — M79.641 PAIN OF RIGHT HAND: ICD-10-CM

## 2019-12-24 PROCEDURE — 99213 OFFICE O/P EST LOW 20 MIN: CPT | Performed by: ORTHOPAEDIC SURGERY

## 2019-12-24 PROCEDURE — 73130 X-RAY EXAM OF HAND: CPT

## 2019-12-24 NOTE — PROGRESS NOTES
Chief Complaint   Patient presents with    Right Hand - Follow-up           Assessment:  Fracture right 5th metacarpal base     Plan : The fracture is definitely healing but not yet totally healed by x-ray  She only has minimal tenderness but cannot work without some protection and therefore I fitted her with a cock-up wrist splint to rest the area  More importantly she had developed severe skin breakdown at the base of her thumb and her palm for some reason and this must be treated aggressively  I want her to soak her hand in warm soapy water for 5 minutes 3 times a day for the next week or 2 until the skin cleans up  She should rinse the soap off thoroughly, dry the skin, and then rub skin cream into the area  She may continue with light duty until I see her back again in 3 weeks for repeat x-ray, discontinuance of her brace and routine follow-up  She may take the brace off for sleeping and bathing      HPI:   This is a 24-year-old white female presenting for orthopedic evaluation regarding her right hand injury sustained 12/06/2019  She was given her daughter a bath when she hit her hand on the side of the tab  She is right-handed  She has not previously having issues with this hand  Her pain is localized along the 5th metacarpal   She presented to the ED the following day on 12/07/2019 where x-rays were performed  These were read as negative and she was discharged  She states she was given a call the following day and informed that there may be a questionable hairline fracture at the 5th metacarpal   She has been wrapping, icing and taking ibuprofen on her own at home  She works in a warehouse and operates machinery  She completed one day of work since her injury without issue  She returns on 12/24/2019 for follow-up  She mentions she has bumped the splint on multiple occasions but overall she reports that she is doing well  She has been compliant with her splint care instructions       The remainder of this patient's past medical history, family history, social history, medicines, and allergies was reviewed in the chart  Please see HPI for pertinent review of systems  All other systems reviewed are negative  Ortho Exam: Ht 5' 2" (1 575 m)   Wt 46 3 kg (102 lb)   BMI 18 66 kg/m²    On today's exam the right hand, her splint was removed  There was some dry skin and scaling at the base of her thumb and palm top but no redness, ecchymosis, swelling or signs infection  She remains tender to palpation at the base of the right 5th metacarpal   She is able to make a full composite fist   Andujar pinch  was 5/5  She can oppose the thumb to the small finger  Wrist range of motion was intact  Sensation is intact to light touch and good capillary refill in all fingers  Distal radial pulse was present  There is no antecubital adenopathy or cellulitis noted  Studies reviewed:  I personally reviewed right hand x-rays as well as the radiologist's report  There is a nondisplaced fracture of the base of the right 5th metacarpal   The metacarpal shaft is not displaced from the fracture  Repeat x-rays done on 12/24/2019 showed good early healing the fracture  The fracture line is still visible    There is no dislocation of the 5th metacarpal base from the smaller fracture fragment or the hamate         Scribe Attestation    I,:   Jemal Lopez MA am acting as a scribe while in the presence of the attending physician :        I,:   Deshawn Sam MD personally performed the services described in this documentation    as scribed in my presence :

## 2019-12-24 NOTE — PATIENT INSTRUCTIONS
Plan :  The fracture is definitely healing but not yet totally healed by x-ray  She only has minimal tenderness but cannot work without some protection and therefore I fitted her with a cock-up wrist splint to rest the area  More importantly she had developed severe skin breakdown at the base of her thumb and her palm for some reason and this must be treated aggressively  I want her to soak her hand in warm soapy water for 5 minutes 3 times a day for the next week or 2 until the skin cleans up  She should rinse the soap off thoroughly, dry the skin, and then rub skin cream into the area  She may continue with light duty until I see her back again in 3 weeks for repeat x-ray, discontinuance of her brace and routine follow-up    She may take the brace off for sleeping and bathing

## 2019-12-26 ENCOUNTER — TELEPHONE (OUTPATIENT)
Dept: OBGYN CLINIC | Facility: HOSPITAL | Age: 29
End: 2019-12-26

## 2019-12-26 NOTE — TELEPHONE ENCOUNTER
Patient sees Dr Brii Aponte  [facility] is calling in to speak with Roxie Cheney regarding this patient in which they received a message regarding her but she is not there patient

## 2019-12-27 NOTE — TELEPHONE ENCOUNTER
I couldn't contact [facility]  I called the patient and she stated she doesn't know what is this about

## 2020-01-14 ENCOUNTER — HOSPITAL ENCOUNTER (OUTPATIENT)
Dept: RADIOLOGY | Facility: HOSPITAL | Age: 30
Discharge: HOME | End: 2020-01-14
Attending: ORTHOPAEDIC SURGERY
Payer: COMMERCIAL

## 2020-01-14 ENCOUNTER — OFFICE VISIT (OUTPATIENT)
Dept: OBGYN CLINIC | Facility: CLINIC | Age: 30
End: 2020-01-14
Payer: COMMERCIAL

## 2020-01-14 VITALS
HEIGHT: 62 IN | DIASTOLIC BLOOD PRESSURE: 66 MMHG | HEART RATE: 75 BPM | BODY MASS INDEX: 18.77 KG/M2 | WEIGHT: 102 LBS | SYSTOLIC BLOOD PRESSURE: 97 MMHG

## 2020-01-14 DIAGNOSIS — S62.346D CLOSED NONDISPLACED FRACTURE OF BASE OF FIFTH METACARPAL BONE OF RIGHT HAND WITH ROUTINE HEALING, SUBSEQUENT ENCOUNTER: ICD-10-CM

## 2020-01-14 DIAGNOSIS — S62.346D CLOSED NONDISPLACED FRACTURE OF BASE OF FIFTH METACARPAL BONE OF RIGHT HAND WITH ROUTINE HEALING, SUBSEQUENT ENCOUNTER: Primary | ICD-10-CM

## 2020-01-14 PROCEDURE — 99213 OFFICE O/P EST LOW 20 MIN: CPT | Performed by: ORTHOPAEDIC SURGERY

## 2020-01-14 PROCEDURE — 73130 X-RAY EXAM OF HAND: CPT

## 2020-01-14 NOTE — PATIENT INSTRUCTIONS
Plan :  The fracture is 90% healed in excellent position  It has not changed in position and continues to heal very nicely  I now want her to discontinue all splinting  She must wash  her hand in warm soapy water once or twice a day to soften the skin and then rub skin cream especially around her thumb in her hand  She may return to full unrestricted duties at work tomorrow 01/15/2020 and I  gave her note to that effect  Some soreness and achiness is normal when you first get out of the splint and that is why she may continue to use ice and Advil, Aleve, or Tylenol on an as-needed basis    I sent her back to her family doctor for routine care and will see her back again if she has any further issues

## 2020-01-14 NOTE — LETTER
January 14, 2020     Patient: Ani Ashby   YOB: 1990   Date of Visit: 1/14/2020       To Whom it May Concern:    Ani Ashby is under my professional care  She was seen in my office on 1/14/2020  She has recovered from her fractured right hand and can return to full unrestricted duties on 01/15/2020  If you have any questions or concerns, please don't hesitate to call           Sincerely,          Patrice Toscano MD        CC: No Recipients

## 2020-01-14 NOTE — PROGRESS NOTES
Chief Complaint   Patient presents with    Right Hand - Follow-up           Assessment:  Fracture right 5th metacarpal base     Plan : The fracture is 90% healed in excellent position  It has not changed in position and continues to heal very nicely  I now want her to discontinue all splinting  She must wash  her hand in warm soapy water once or twice a day to soften the skin and then rub skin cream especially around her thumb in her hand  She may return to full unrestricted duties at work tomorrow 01/15/2020 and I  gave her note to that effect  Some soreness and achiness is normal when you first get out of the splint and that is why she may continue to use ice and Advil, Aleve, or Tylenol on an as-needed basis  I sent her back to her family doctor for routine care and will see her back again if she has any further issues      HPI:   This is a 27-year-old white female presenting for orthopedic evaluation regarding her right hand injury sustained 12/06/2019  She was given her daughter a bath when she hit her hand on the side of the tab  She is right-handed  She has not previously having issues with this hand  Her pain is localized along the 5th metacarpal   She presented to the ED the following day on 12/07/2019 where x-rays were performed  These were read as negative and she was discharged  She states she was given a call the following day and informed that there may be a questionable hairline fracture at the 5th metacarpal   She has been wrapping, icing and taking ibuprofen on her own at home  She works in a warehouse and operates machinery  She completed one day of work since her injury without issue  She returns on 12/24/2019 for follow-up  She mentions she has bumped the splint on multiple occasions but overall she reports that she is doing well  She has been compliant with her splint care instructions    Her next visit is on 01/14/2020 she is now just shy of 6 weeks after nondisplaced fracture of the base of the right 5th metacarpal   She has done icing her splint other than some irritation in the thumb webspace  She has no numbness, tingling, or paresthesias  She continues to work at mostly regular duty watching heavy lifting    The remainder of this patient's past medical history, family history, social history, medicines, and allergies was reviewed in the chart  Please see HPI for pertinent review of systems  All other systems reviewed are negative  Ortho Exam: BP 97/66   Pulse 75   Ht 5' 2" (1 575 m)   Wt 46 3 kg (102 lb)   BMI 18 66 kg/m²    Removed her splint today  She still so as some irritation at the base of her thumb from the thumb strap  There is no swelling, ecchymosis or redness over the 5th metacarpal and no deformity is noted  She was nontender to palpation over the bone  She has maintained full finger motion at the MCP joint  Radial pulse was normal   Sensation her fingers was normal   She has good elbow flexion extension    Studies reviewed:  I personally reviewed right hand x-rays as well as the radiologist's report  There is a nondisplaced fracture of the base of the right 5th metacarpal   The metacarpal shaft is not displaced from the fracture  Repeat x-rays done on 12/24/2019 showed good early healing the fracture  The fracture line is still visible  There is no dislocation of the 5th metacarpal base from the smaller fracture fragment or the hamate  New x-rays of her right hand on 01/14/2020 were personally reviewed and showed that the fracture just about totally healed in close to anatomic position    The fracture line is only faintly visible         Scribe Attestation    I,:    am acting as a scribe while in the presence of the attending physician :        I,:    personally performed the services described in this documentation    as scribed in my presence :

## 2021-09-30 ENCOUNTER — HOSPITAL ENCOUNTER (EMERGENCY)
Facility: HOSPITAL | Age: 31
Discharge: HOME | End: 2021-09-30
Attending: EMERGENCY MEDICINE | Admitting: EMERGENCY MEDICINE
Payer: COMMERCIAL

## 2021-09-30 VITALS
TEMPERATURE: 96.5 F | WEIGHT: 102.9 LBS | SYSTOLIC BLOOD PRESSURE: 137 MMHG | HEART RATE: 86 BPM | OXYGEN SATURATION: 98 % | DIASTOLIC BLOOD PRESSURE: 61 MMHG | RESPIRATION RATE: 18 BRPM | BODY MASS INDEX: 18.82 KG/M2

## 2021-09-30 DIAGNOSIS — L23.1 CONTACT DERMATITIS DUE TO ADHESIVE BANDAGE: Primary | ICD-10-CM

## 2021-09-30 PROCEDURE — 99282 EMERGENCY DEPT VISIT SF MDM: CPT

## 2021-09-30 PROCEDURE — 99284 EMERGENCY DEPT VISIT MOD MDM: CPT | Performed by: PHYSICIAN ASSISTANT

## 2021-09-30 RX ORDER — HYDROCORTISONE 25 MG/ML
LOTION TOPICAL 2 TIMES DAILY
Qty: 50 ML | Refills: 0 | Status: SHIPPED | OUTPATIENT
Start: 2021-09-30

## 2021-11-09 ENCOUNTER — HOSPITAL ENCOUNTER (EMERGENCY)
Facility: HOSPITAL | Age: 31
Discharge: HOME | End: 2021-11-09
Attending: EMERGENCY MEDICINE | Admitting: EMERGENCY MEDICINE
Payer: COMMERCIAL

## 2021-11-09 VITALS
BODY MASS INDEX: 18.83 KG/M2 | DIASTOLIC BLOOD PRESSURE: 70 MMHG | HEART RATE: 88 BPM | WEIGHT: 102.95 LBS | TEMPERATURE: 97.3 F | OXYGEN SATURATION: 100 % | RESPIRATION RATE: 18 BRPM | SYSTOLIC BLOOD PRESSURE: 130 MMHG

## 2021-11-09 DIAGNOSIS — K04.7 DENTAL ABSCESS: Primary | ICD-10-CM

## 2021-11-09 PROCEDURE — 99283 EMERGENCY DEPT VISIT LOW MDM: CPT

## 2021-11-09 PROCEDURE — 99284 EMERGENCY DEPT VISIT MOD MDM: CPT | Performed by: EMERGENCY MEDICINE

## 2021-11-09 RX ORDER — AMOXICILLIN AND CLAVULANATE POTASSIUM 875; 125 MG/1; MG/1
1 TABLET, FILM COATED ORAL ONCE
Status: COMPLETED | OUTPATIENT
Start: 2021-11-09 | End: 2021-11-09

## 2021-11-09 RX ORDER — NAPROXEN 500 MG/1
500 TABLET ORAL ONCE
Status: COMPLETED | OUTPATIENT
Start: 2021-11-09 | End: 2021-11-09

## 2021-11-09 RX ORDER — AMOXICILLIN AND CLAVULANATE POTASSIUM 875; 125 MG/1; MG/1
1 TABLET, FILM COATED ORAL EVERY 12 HOURS
Qty: 14 TABLET | Refills: 0 | Status: SHIPPED | OUTPATIENT
Start: 2021-11-09 | End: 2021-11-16

## 2021-11-09 RX ORDER — ACETAMINOPHEN AND CODEINE PHOSPHATE 300; 30 MG/1; MG/1
1-2 TABLET ORAL EVERY 6 HOURS PRN
Qty: 5 TABLET | Refills: 0 | Status: SHIPPED | OUTPATIENT
Start: 2021-11-09 | End: 2021-11-19

## 2021-11-09 RX ORDER — NAPROXEN 500 MG/1
500 TABLET ORAL 2 TIMES DAILY WITH MEALS
Qty: 20 TABLET | Refills: 0 | Status: SHIPPED | OUTPATIENT
Start: 2021-11-09

## 2021-11-09 RX ADMIN — NAPROXEN 500 MG: 500 TABLET ORAL at 05:28

## 2021-11-09 RX ADMIN — AMOXICILLIN AND CLAVULANATE POTASSIUM 1 TABLET: 875; 125 TABLET, FILM COATED ORAL at 05:28

## 2022-06-02 ENCOUNTER — HOSPITAL ENCOUNTER (EMERGENCY)
Facility: HOSPITAL | Age: 32
Discharge: HOME | End: 2022-06-02
Attending: EMERGENCY MEDICINE
Payer: COMMERCIAL

## 2022-06-02 VITALS
TEMPERATURE: 99.1 F | BODY MASS INDEX: 18.69 KG/M2 | SYSTOLIC BLOOD PRESSURE: 101 MMHG | HEART RATE: 77 BPM | WEIGHT: 102.2 LBS | DIASTOLIC BLOOD PRESSURE: 59 MMHG | RESPIRATION RATE: 16 BRPM | OXYGEN SATURATION: 99 %

## 2022-06-02 DIAGNOSIS — M54.50 ACUTE LEFT-SIDED LOW BACK PAIN WITHOUT SCIATICA: Primary | ICD-10-CM

## 2022-06-02 PROCEDURE — 99284 EMERGENCY DEPT VISIT MOD MDM: CPT

## 2022-06-02 PROCEDURE — 99283 EMERGENCY DEPT VISIT LOW MDM: CPT

## 2022-06-02 RX ORDER — LIDOCAINE 50 MG/G
3 PATCH TOPICAL ONCE
Status: DISCONTINUED | OUTPATIENT
Start: 2022-06-02 | End: 2022-06-02 | Stop reason: HOSPADM

## 2022-06-02 RX ORDER — METHOCARBAMOL 500 MG/1
500 TABLET, FILM COATED ORAL 2 TIMES DAILY
Qty: 20 TABLET | Refills: 0 | Status: SHIPPED | OUTPATIENT
Start: 2022-06-02

## 2022-06-02 RX ORDER — IBUPROFEN 400 MG/1
800 TABLET ORAL ONCE
Status: COMPLETED | OUTPATIENT
Start: 2022-06-02 | End: 2022-06-02

## 2022-06-02 RX ADMIN — LIDOCAINE 3 PATCH: 50 PATCH TOPICAL at 20:21

## 2022-06-02 RX ADMIN — IBUPROFEN 800 MG: 400 TABLET ORAL at 20:21

## 2022-06-02 NOTE — Clinical Note
Gavin Jimenez was seen and treated in our emergency department on 6/2/2022  No restrictions            Diagnosis:     Bernadine    She may return on this date: If you have any questions or concerns, please don't hesitate to call        Malik Denise PA-C    ______________________________           _______________          _______________  Hospital Representative                              Date                                Time

## 2022-06-02 NOTE — Clinical Note
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

## 2022-06-02 NOTE — Clinical Note
Ruth Barr was seen and treated in our emergency department on 6/2/2022  No restrictions            Diagnosis:     Bernadine    She may return on this date: If you have any questions or concerns, please don't hesitate to call        Arden Clemente PA-C    ______________________________           _______________          _______________  Hospital Representative                              Date                                Time

## 2022-06-03 NOTE — ED PROVIDER NOTES
History  Chief Complaint   Patient presents with    Back Pain     "I think I pulled a muscle in my back and I want it checked to make sure I do not over work myself "  Patient carrying 2 baskets of laundry up a flight of stairs and later when doing something back began to hurt  Took Advil 2 tablets at 1030  Back injury occurred last night  Patient is a 49-year-old female coming in for complaint of left lower back pain with started yesterday  Does not radiate  Patient denies any inciting trauma  Has been able to defecate, has not will control her bowels, and has normal sensation in her groin area  History provided by:  Patient   used: No    Back Pain  Location:  Lumbar spine  Radiates to:  Does not radiate  Pain severity:  Mild  Associated symptoms: no abdominal pain, no abdominal swelling, no bladder incontinence, no bowel incontinence, no dysuria, no fever, no headaches, no numbness, no paresthesias, no pelvic pain, no weakness and no weight loss        Prior to Admission Medications   Prescriptions Last Dose Informant Patient Reported? Taking?   desogestrel-ethinyl estradiol (APRI) 0 15-30 MG-MCG per tablet   Yes No   Sig: Take 1 tablet by mouth daily   diphenhydrAMINE-zinc acetate (BENADRYL) cream   No No   Sig: Apply topically 3 (three) times a day as needed for itching   hydrocortisone 2 5 % lotion   No No   Sig: Apply topically 2 (two) times a day   naproxen (NAPROSYN) 500 mg tablet   No No   Sig: Take 1 tablet (500 mg total) by mouth 2 (two) times a day with meals      Facility-Administered Medications: None       History reviewed  No pertinent past medical history  Past Surgical History:   Procedure Laterality Date     SECTION         History reviewed  No pertinent family history  I have reviewed and agree with the history as documented      E-Cigarette/Vaping    E-Cigarette Use Never User      E-Cigarette/Vaping Substances    Nicotine No     THC No     CBD No     Flavoring No     Other No     Unknown No      Social History     Tobacco Use    Smoking status: Current Every Day Smoker     Packs/day: 0 25    Smokeless tobacco: Never Used   Vaping Use    Vaping Use: Never used   Substance Use Topics    Alcohol use: Yes     Comment: rare    Drug use: Never       Review of Systems   Constitutional: Negative  Negative for fever and weight loss  HENT: Negative  Eyes: Negative  Respiratory: Negative  Cardiovascular: Negative  Gastrointestinal: Negative  Negative for abdominal pain and bowel incontinence  Genitourinary: Negative  Negative for bladder incontinence, dysuria and pelvic pain  Musculoskeletal: Positive for back pain  Negative for gait problem and joint swelling  Skin: Negative  Neurological: Negative  Negative for weakness, numbness, headaches and paresthesias  Psychiatric/Behavioral: Negative  Physical Exam  Physical Exam  Vitals reviewed  Constitutional:       Appearance: Normal appearance  She is normal weight  HENT:      Head: Normocephalic and atraumatic  Right Ear: External ear normal       Left Ear: External ear normal       Nose: Nose normal    Eyes:      Conjunctiva/sclera: Conjunctivae normal    Cardiovascular:      Rate and Rhythm: Normal rate  Pulmonary:      Effort: Pulmonary effort is normal    Musculoskeletal:         General: Normal range of motion  Cervical back: Normal range of motion  Skin:     General: Skin is warm and dry  Neurological:      Mental Status: She is alert           Vital Signs  ED Triage Vitals [06/02/22 1953]   Temperature Pulse Respirations Blood Pressure SpO2   99 1 °F (37 3 °C) 77 16 101/59 99 %      Temp Source Heart Rate Source Patient Position - Orthostatic VS BP Location FiO2 (%)   Oral Monitor Sitting Left arm --      Pain Score       3           Vitals:    06/02/22 1953   BP: 101/59   Pulse: 77   Patient Position - Orthostatic VS: Sitting         Visual Acuity      ED Medications  Medications   lidocaine (LIDODERM) 5 % patch 3 patch (3 patches Topical Medication Applied 6/2/22 2021)   ibuprofen (MOTRIN) tablet 800 mg (800 mg Oral Given 6/2/22 2021)       Diagnostic Studies  Results Reviewed     None                 No orders to display              Procedures  Procedures         ED Course                               SBIRT 20yo+    Flowsheet Row Most Recent Value   SBIRT (25 yo +)    In order to provide better care to our patients, we are screening all of our patients for alcohol and drug use  Would it be okay to ask you these screening questions? Yes Filed at: 06/02/2022 2001   Initial Alcohol Screen: US AUDIT-C     1  How often do you have a drink containing alcohol? 0 Filed at: 06/02/2022 2001   2  How many drinks containing alcohol do you have on a typical day you are drinking? 0 Filed at: 06/02/2022 2001   3b  FEMALE Any Age, or MALE 65+: How often do you have 4 or more drinks on one occassion? 0 Filed at: 06/02/2022 2001   Audit-C Score 0 Filed at: 06/02/2022 2001   SANDRA: How many times in the past year have you    Used an illegal drug or used a prescription medication for non-medical reasons? Never Filed at: 06/02/2022 2001                    MDM  Number of Diagnoses or Management Options  Acute left-sided low back pain without sciatica: new and does not require workup  Diagnosis management comments: Patient presents with back pain for the last 2 days  Patient is in no acute distress  Patient was discharged home with lidocaine patches, and prescription for robaxan    Counseling: I had a detailed discussion with the patient and/or guardian regarding: the historical points, exam findings, and any diagnostic results supporting the discharge diagnosis, lab results, radiology results, discharge instructions reviewed with patient and/or family/caregiver and understanding was verbalized   Instructions given to return to the emergency department if symptoms worsen or persist, or if there are any questions or concerns that arise at home       All labs reviewed and utilized in the medical decision making process     All radiology studies independently viewed by me and interpreted by the radiologist     Portions of the record may have been created with voice recognition software   Occasional wrong word or "sound a like" substitutions may have occurred due to the inherent limitations of voice recognition software   Read the chart carefully and recognize, using context, where substitutions have occurred  Risk of Complications, Morbidity, and/or Mortality  Presenting problems: minimal  Diagnostic procedures: minimal  Management options: minimal    Patient Progress  Patient progress: stable      Disposition  Final diagnoses:   Acute left-sided low back pain without sciatica     Time reflects when diagnosis was documented in both MDM as applicable and the Disposition within this note     Time User Action Codes Description Comment    6/2/2022  8:06 PM Emmanuel Ozuna Add [M54 50] Acute left-sided low back pain without sciatica       ED Disposition     ED Disposition   Discharge    Condition   Stable    Date/Time   Thu Jun 2, 2022  8:06 PM    Comment   Alie Singletary discharge to home/self care  Follow-up Information     Follow up With Specialties Details Why Contact Info Additional Information    Karon Rascon MD Family Medicine   [address] [facility]  [phone]       [facility] Emergency Department Emergency Medicine  As needed, If symptoms worsen [address] [address] [address] [facility] Emergency Department          Discharge Medication List as of 6/2/2022  8:07 PM      START taking these medications    Details   methocarbamol (ROBAXIN) 500 mg tablet Take 1 tablet (500 mg total) by mouth 2 (two) times a day, Starting Thu 6/2/2022, Normal         CONTINUE these medications which have NOT CHANGED    Details   desogestrel-ethinyl estradiol (APRI) 0 15-30 MG-MCG per tablet Take 1 tablet by mouth daily, Starting Tue 4/16/2019, Historical Med      diphenhydrAMINE-zinc acetate (BENADRYL) cream Apply topically 3 (three) times a day as needed for itching, Starting Fri 8/16/2019, Print      hydrocortisone 2 5 % lotion Apply topically 2 (two) times a day, Starting Thu 9/30/2021, Print      naproxen (NAPROSYN) 500 mg tablet Take 1 tablet (500 mg total) by mouth 2 (two) times a day with meals, Starting Tue 11/9/2021, Print             No discharge procedures on file      PDMP Review     None          ED Provider  Electronically Signed by           Carlitos Watt PA-C  06/02/22 2035

## 2023-05-02 ENCOUNTER — HOSPITAL ENCOUNTER (EMERGENCY)
Facility: HOSPITAL | Age: 33
Discharge: HOME | End: 2023-05-02
Attending: EMERGENCY MEDICINE

## 2023-05-02 ENCOUNTER — APPOINTMENT (EMERGENCY)
Dept: RADIOLOGY | Facility: HOSPITAL | Age: 33
End: 2023-05-02

## 2023-05-02 VITALS
DIASTOLIC BLOOD PRESSURE: 64 MMHG | BODY MASS INDEX: 19.64 KG/M2 | WEIGHT: 106.7 LBS | HEART RATE: 71 BPM | RESPIRATION RATE: 15 BRPM | HEIGHT: 62 IN | SYSTOLIC BLOOD PRESSURE: 107 MMHG | OXYGEN SATURATION: 98 % | TEMPERATURE: 97.7 F

## 2023-05-02 DIAGNOSIS — R07.89 LEFT-SIDED CHEST WALL PAIN: Primary | ICD-10-CM

## 2023-05-02 RX ORDER — LIDOCAINE 50 MG/G
1 PATCH TOPICAL DAILY
Qty: 7 PATCH | Refills: 0 | Status: SHIPPED | OUTPATIENT
Start: 2023-05-02

## 2023-05-02 RX ORDER — IBUPROFEN 400 MG/1
400 TABLET ORAL EVERY 6 HOURS PRN
Qty: 15 TABLET | Refills: 0 | Status: SHIPPED | OUTPATIENT
Start: 2023-05-02

## 2023-05-02 NOTE — ED PROVIDER NOTES
History  Chief Complaint   Patient presents with    Rib Pain     Left rib pain, seen at [facility] 2 weeks ago for same, reports normal x-ray at that time  Taking naproxen with no relief     HPI  She has been having left-sided anterior pain in the chest wall after somebody picked her up and carried her and swung her around  She felt a pop in the left side in that area and has been hurting ever since  She was seen at [facility] had a rib series performed  External review of the reading shows it was normal, no fracture or pneumothorax or any other abnormality  Unfortunately the films are not able to be viewed  She has been having ongoing pain despite taking Naprosyn  He is not short of breath  Some movements make it worse but certainly deep breathing does make it a little worse  She does not feel short of breath  There is been no rash  No bruising  No other trauma  She does smoke  She has been working on taking deep breaths  Prior to Admission Medications   Prescriptions Last Dose Informant Patient Reported? Taking?   desogestrel-ethinyl estradiol (APRI) 0 15-30 MG-MCG per tablet   Yes No   Sig: Take 1 tablet by mouth daily   diphenhydrAMINE-zinc acetate (BENADRYL) cream   No No   Sig: Apply topically 3 (three) times a day as needed for itching   hydrocortisone 2 5 % lotion   No No   Sig: Apply topically 2 (two) times a day   methocarbamol (ROBAXIN) 500 mg tablet   No No   Sig: Take 1 tablet (500 mg total) by mouth 2 (two) times a day   naproxen (NAPROSYN) 500 mg tablet   No No   Sig: Take 1 tablet (500 mg total) by mouth 2 (two) times a day with meals      Facility-Administered Medications: None       History reviewed  No pertinent past medical history  Past Surgical History:   Procedure Laterality Date     SECTION         History reviewed  No pertinent family history  I have reviewed and agree with the history as documented      E-Cigarette/Vaping    E-Cigarette Use Never User E-Cigarette/Vaping Substances    Nicotine No     THC No     CBD No     Flavoring No     Other No     Unknown No      Social History     Tobacco Use    Smoking status: Every Day     Packs/day: 0 25     Types: Cigarettes    Smokeless tobacco: Never   Vaping Use    Vaping Use: Never used   Substance Use Topics    Alcohol use: Yes     Comment: rare    Drug use: Never       Review of Systems    Physical Exam  Physical Exam  Vitals and nursing note reviewed  Constitutional:       General: She is not in acute distress  Appearance: Normal appearance  She is well-developed  She is not ill-appearing, toxic-appearing or diaphoretic  HENT:      Head: Normocephalic and atraumatic  Right Ear: Hearing normal  No drainage or swelling  Left Ear: Hearing normal  No drainage or swelling  Eyes:      General: Lids are normal          Right eye: No discharge  Left eye: No discharge  Conjunctiva/sclera: Conjunctivae normal    Neck:      Vascular: No JVD  Trachea: Trachea normal    Cardiovascular:      Rate and Rhythm: Normal rate and regular rhythm  Pulses: Normal pulses  Heart sounds: Normal heart sounds  No murmur heard  No friction rub  No gallop  Pulmonary:      Effort: Pulmonary effort is normal  No respiratory distress  Breath sounds: Normal breath sounds  No stridor  No wheezing or rales  Chest:      Chest wall: Tenderness present  Abdominal:      Palpations: Abdomen is soft  Tenderness: There is no abdominal tenderness  There is no guarding or rebound  Musculoskeletal:         General: Normal range of motion  Cervical back: Normal range of motion  Skin:     General: Skin is warm and dry  Findings: No bruising, erythema or rash  Neurological:      General: No focal deficit present  Mental Status: She is alert  GCS: GCS eye subscore is 4  GCS verbal subscore is 5  GCS motor subscore is 6  Sensory: No sensory deficit  Motor: No abnormal muscle tone  Psychiatric:         Mood and Affect: Mood normal          Speech: Speech normal          Behavior: Behavior is cooperative  Vital Signs  ED Triage Vitals [05/02/23 1228]   Temperature Pulse Respirations Blood Pressure SpO2   97 7 °F (36 5 °C) 71 15 107/64 98 %      Temp Source Heart Rate Source Patient Position - Orthostatic VS BP Location FiO2 (%)   Oral -- -- -- --      Pain Score       4           Vitals:    05/02/23 1228   BP: 107/64   Pulse: 71         Visual Acuity      ED Medications  Medications - No data to display    Diagnostic Studies  Results Reviewed     None                 XR chest 2 views   ED Interpretation by Sara Ramos MD (05/02 1349)   I have reviewed the film, per my independent interpretation : No fracture or pneumothorax  No infiltrate  No acute disease           Final Result by Abigail Lockett MD (05/02 1347)      No acute cardiopulmonary disease  Workstation performed: [id number]                    Procedures  Procedures         ED Course                       PERC Rule for PE    Flowsheet Row Most Recent Value   PERC Rule for PE    Age >=50 0 Filed at: 05/02/2023 1419   HR >=100 0 Filed at: 05/02/2023 1419   O2 Sat on room air < 95% 0 Filed at: 05/02/2023 1419   History of PE or DVT 0 Filed at: 05/02/2023 1419   Recent trauma or surgery 0 Filed at: 05/02/2023 1419   Hemoptysis 0 Filed at: 05/02/2023 1419   Exogenous estrogen 0 Filed at: 05/02/2023 1419   Unilateral leg swelling 0 Filed at: 05/02/2023 1419   PERC Rule for PE Results 0 Filed at: 05/02/2023 1419              SBIRT 20yo+    Flowsheet Row Most Recent Value   Initial Alcohol Screen: US AUDIT-C     1  How often do you have a drink containing alcohol? 0 Filed at: 05/02/2023 1229   2  How many drinks containing alcohol do you have on a typical day you are drinking? 0 Filed at: 05/02/2023 1229   3a  Male UNDER 65:  How often do you have five or more drinks on one occasion? 0 Filed at: 05/02/2023 1229   3b  FEMALE Any Age, or MALE 65+: How often do you have 4 or more drinks on one occassion? 0 Filed at: 05/02/2023 1229   Audit-C Score 0 Filed at: 05/02/2023 1229   SANDRA: How many times in the past year have you    Used an illegal drug or used a prescription medication for non-medical reasons? Never Filed at: 05/02/2023 1229                    Medical Decision Making  There is no acute rib fracture and this appears to be more like an intercostal strain  We will try some ibuprofen and Lidoderm patch  Do not see a pneumothorax or rib fracture  No infiltrate  Follow-up with primary care so since she does not have a doctor and she is on Medicaid sent ambulatory referral to family [place] for follow-up  PERC negative  Amount and/or Complexity of Data Reviewed  Radiology: ordered  Risk  Prescription drug management  Disposition  Final diagnoses:   Left-sided chest wall pain - intercostal strain     Time reflects when diagnosis was documented in both MDM as applicable and the Disposition within this note     Time User Action Codes Description Comment    5/2/2023  1:50 PM Penne Farmington Add [R07 89] Left-sided chest wall pain     5/2/2023  1:50 PM Penne Ariella Modify [R07 89] Left-sided chest wall pain intercostal strain\    5/2/2023  1:50 PM Penne Farmington Modify [R07 89] Left-sided chest wall pain intercostal strain      ED Disposition     ED Disposition   Discharge    Condition   Stable    Date/Time   Tue May 2, 2023  1:49 PM    Comment   Tiff Iqbal discharge to home/self care                 Follow-up Information     Follow up With Specialties Details Why Contact Info Additional [address] [facility] Schedule an appointment as soon as possible for a visit in 1 week reevaluation and to establish primary care [address], [address] [facility] [address]  [address], [address], [address] [place], [place], [address]          Discharge Medication List as of 5/2/2023  1:52 PM      START taking these medications    Details   ibuprofen (MOTRIN) 400 mg tablet Take 1 tablet (400 mg total) by mouth every 6 (six) hours as needed for mild pain or moderate pain for up to 15 doses, Starting Tue 5/2/2023, Normal      lidocaine (LIDODERM) 5 % Apply 1 patch topically over 12 hours daily Remove & Discard patch within 12 hours or as directed by MD, Starting Tue 5/2/2023, Normal         CONTINUE these medications which have NOT CHANGED    Details   desogestrel-ethinyl estradiol (APRI) 0 15-30 MG-MCG per tablet Take 1 tablet by mouth daily, Starting Tue 4/16/2019, Historical Med      diphenhydrAMINE-zinc acetate (BENADRYL) cream Apply topically 3 (three) times a day as needed for itching, Starting Fri 8/16/2019, Print      hydrocortisone 2 5 % lotion Apply topically 2 (two) times a day, Starting Thu 9/30/2021, Print      methocarbamol (ROBAXIN) 500 mg tablet Take 1 tablet (500 mg total) by mouth 2 (two) times a day, Starting Thu 6/2/2022, Normal      naproxen (NAPROSYN) 500 mg tablet Take 1 tablet (500 mg total) by mouth 2 (two) times a day with meals, Starting Tue 11/9/2021, Print                 PDMP Review     None          ED Provider  Electronically Signed by           Toby Munroe MD  05/02/23 6994

## 2023-08-10 ENCOUNTER — HOSPITAL ENCOUNTER (EMERGENCY)
Facility: HOSPITAL | Age: 33
Discharge: HOME | End: 2023-08-10
Attending: EMERGENCY MEDICINE | Admitting: EMERGENCY MEDICINE
Payer: COMMERCIAL

## 2023-08-10 ENCOUNTER — APPOINTMENT (EMERGENCY)
Dept: RADIOLOGY | Facility: HOSPITAL | Age: 33
End: 2023-08-10
Payer: COMMERCIAL

## 2023-08-10 VITALS
TEMPERATURE: 98.1 F | BODY MASS INDEX: 19.19 KG/M2 | HEART RATE: 64 BPM | SYSTOLIC BLOOD PRESSURE: 128 MMHG | OXYGEN SATURATION: 100 % | WEIGHT: 104.94 LBS | DIASTOLIC BLOOD PRESSURE: 80 MMHG | RESPIRATION RATE: 20 BRPM

## 2023-08-10 DIAGNOSIS — M25.562 LEFT KNEE PAIN: Primary | ICD-10-CM

## 2023-08-10 PROCEDURE — 73564 X-RAY EXAM KNEE 4 OR MORE: CPT

## 2023-08-10 NOTE — ED NOTES
Pt walked out of room carrying the crutches instead of using them     Kenan Winter, HERBERT  08/10/23 6600

## 2023-08-10 NOTE — ED PROVIDER NOTES
History  Chief Complaint   Patient presents with   • Knee Pain     Reports posterior L knee pain x 3 days     Patient is a 57-year-old female coming in for left knee pain. She states she feels like there is something swelling inside of the knee. Denies falls or trauma. 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 Denies previous surgeries. No history of DVT or PE, no recent surgeries, cancer treatments, periods of activity. Prior to Admission Medications   Prescriptions Last Dose Informant Patient Reported? Taking?   desogestrel-ethinyl estradiol (APRI) 0.15-30 MG-MCG per tablet   Yes No   Sig: Take 1 tablet by mouth daily   diphenhydrAMINE-zinc acetate (BENADRYL) cream   No No   Sig: Apply topically 3 (three) times a day as needed for itching   hydrocortisone 2.5 % lotion   No No   Sig: Apply topically 2 (two) times a day   ibuprofen (MOTRIN) 400 mg tablet   No No   Sig: Take 1 tablet (400 mg total) by mouth every 6 (six) hours as needed for mild pain or moderate pain for up to 15 doses   lidocaine (LIDODERM) 5 %   No No   Sig: Apply 1 patch topically over 12 hours daily Remove & Discard patch within 12 hours or as directed by MD   methocarbamol (ROBAXIN) 500 mg tablet   No No   Sig: Take 1 tablet (500 mg total) by mouth 2 (two) times a day   naproxen (NAPROSYN) 500 mg tablet   No No   Sig: Take 1 tablet (500 mg total) by mouth 2 (two) times a day with meals      Facility-Administered Medications: None       History reviewed. No pertinent past medical history. Past Surgical History:   Procedure Laterality Date   •  SECTION         History reviewed. No pertinent family history. I have reviewed and agree with the history as documented.     E-Cigarette/Vaping   • E-Cigarette Use Never User      E-Cigarette/Vaping Substances   • Nicotine No    • THC No    • CBD No    • Flavoring No    • Other No    • Unknown No      Social History     Tobacco Use   • Smoking status: Every Day     Packs/day: 0.25     Types: Cigarettes   • Smokeless tobacco: Never   Vaping Use   • Vaping Use: Never used   Substance Use Topics   • Alcohol use: Yes     Comment: rare   • Drug use: Never       Review of Systems   Constitutional: Negative. Negative for chills and fever. HENT: Negative. Negative for rhinorrhea, sore throat, trouble swallowing and voice change. Eyes: Negative. Negative for pain and visual disturbance. Respiratory: Negative. Negative for cough, shortness of breath and wheezing. Cardiovascular: Negative. Negative for chest pain and palpitations. Gastrointestinal: Negative for abdominal pain, diarrhea, nausea and vomiting. Genitourinary: Negative. Negative for dysuria and frequency. Musculoskeletal: Positive for arthralgias. Negative for neck pain and neck stiffness. Skin: Negative. Negative for rash. Neurological: Negative. Negative for dizziness, speech difficulty, weakness, light-headedness and numbness. Physical Exam  Physical Exam  Vitals and nursing note reviewed. Constitutional:       General: She is not in acute distress. Appearance: She is well-developed. HENT:      Head: Normocephalic and atraumatic. Eyes:      Conjunctiva/sclera: Conjunctivae normal.      Pupils: Pupils are equal, round, and reactive to light. Neck:      Trachea: No tracheal deviation. Cardiovascular:      Rate and Rhythm: Normal rate and regular rhythm. Pulmonary:      Effort: Pulmonary effort is normal. No respiratory distress. Breath sounds: Normal breath sounds. No wheezing or rales. Abdominal:      General: Bowel sounds are normal. There is no distension. Palpations: Abdomen is soft. Tenderness: There is no abdominal tenderness. There is no guarding or rebound. Musculoskeletal:         General: No tenderness or deformity. Normal range of motion. Cervical back: Normal range of motion and neck supple.         Legs: Comments: Patient has mild tenderness to palpation on the posterior aspect of her lateral left knee. No erythema no swelling or bruising noted. Patient with full range of motion although she has discomfort while trying to bend her knee. No evidence of vascular compromise. Skin:     General: Skin is warm and dry. Capillary Refill: Capillary refill takes less than 2 seconds. Findings: No rash. Neurological:      Mental Status: She is alert and oriented to person, place, and time. Psychiatric:         Behavior: Behavior normal.         Vital Signs  ED Triage Vitals [08/10/23 1650]   Temperature Pulse Respirations Blood Pressure SpO2   98.1 °F (36.7 °C) 64 20 128/80 100 %      Temp Source Heart Rate Source Patient Position - Orthostatic VS BP Location FiO2 (%)   Tympanic Monitor Sitting Left arm --      Pain Score       7           Vitals:    08/10/23 1650   BP: 128/80   Pulse: 64   Patient Position - Orthostatic VS: Sitting         Visual Acuity      ED Medications  Medications - No data to display    Diagnostic Studies  Results Reviewed     None                 XR knee 4+ vw left injury   ED Interpretation by Nicolas Lopez DO (08/10 1817)   No acute fracture or dislocation appreciated. Procedures  Procedures         ED Course                                             Medical Decision Making     Reviewed past medical records: Yes, no previous surgeries or injuries to left knee noted. History Provided by: Patient     Differential considered: Sprain, strain, Baker's cyst, DVT     Consideration of tests: Wells DVT score is 0, no evidence of trauma, x-ray negative for interpretation for fracture or dislocation. More likely sprain or strain. Patient advised for work modifications, placed in a knee immobilizer given crutches. Follow-up and return precautions reviewed. I have reviewed the patient's visit and any testing done in the emergency department.   They have verbalized their understanding of any testing done today and have no further questions or concerns regarding their care in the emergency room. They will follow up with their primary care physician as well as with any specialist in their discharge instructions. Strict return precautions were discussed. Amount and/or Complexity of Data Reviewed  Radiology: ordered and independent interpretation performed. Disposition  Final diagnoses:   Left knee pain     Time reflects when diagnosis was documented in both MDM as applicable and the Disposition within this note     Time User Action Codes Description Comment    8/10/2023  6:12 PM En [facility] Add [C91.670] Left knee pain       ED Disposition     ED Disposition   Discharge    Condition   Stable    Date/Time   Thu Aug 10, 2023  6:11 PM    Comment   Kelly Garcia discharge to home/self care. Follow-up Information    None         Patient's Medications   Discharge Prescriptions    No medications on file       No discharge procedures on file.     PDMP Review     None          ED Provider  Electronically Signed by           Penny Spear DO  08/10/23 8652

## 2023-08-10 NOTE — Clinical Note
Eleanor Mcbride was seen and treated in our emergency department on 8/10/2023. Diagnosis:     Bernadine  . She may return on this date: 08/13/2023         If you have any questions or concerns, please don't hesitate to call.       Rosita Stanford, DO    ______________________________           _______________          _______________  Hospital Representative                              Date                                Time

## 2024-09-04 ENCOUNTER — HOSPITAL ENCOUNTER (EMERGENCY)
Facility: HOSPITAL | Age: 34
Discharge: HOME | End: 2024-09-04
Attending: EMERGENCY MEDICINE
Payer: COMMERCIAL

## 2024-09-04 VITALS
SYSTOLIC BLOOD PRESSURE: 122 MMHG | DIASTOLIC BLOOD PRESSURE: 80 MMHG | OXYGEN SATURATION: 99 % | WEIGHT: 106 LBS | TEMPERATURE: 98.7 F | RESPIRATION RATE: 16 BRPM | HEIGHT: 62 IN | BODY MASS INDEX: 19.51 KG/M2 | HEART RATE: 84 BPM

## 2024-09-04 DIAGNOSIS — L03.90 CELLULITIS: Primary | ICD-10-CM

## 2024-09-04 PROCEDURE — 99282 EMERGENCY DEPT VISIT SF MDM: CPT

## 2024-09-04 PROCEDURE — 99284 EMERGENCY DEPT VISIT MOD MDM: CPT | Performed by: EMERGENCY MEDICINE

## 2024-09-04 RX ORDER — CEPHALEXIN 500 MG/1
500 CAPSULE ORAL EVERY 6 HOURS SCHEDULED
Qty: 28 CAPSULE | Refills: 0 | Status: SHIPPED | OUTPATIENT
Start: 2024-09-04 | End: 2024-09-11

## 2024-09-04 RX ORDER — CEPHALEXIN 500 MG/1
500 CAPSULE ORAL ONCE
Status: COMPLETED | OUTPATIENT
Start: 2024-09-04 | End: 2024-09-04

## 2024-09-04 RX ADMIN — CEPHALEXIN 500 MG: 500 CAPSULE ORAL at 19:44

## 2024-09-06 NOTE — ED PROVIDER NOTES
History  Chief Complaint   Patient presents with    Spider Bite     Comes to ed c/o a possible spider bite (2 weeks ago) on right ankle. Patient has been keeping it covered, clean and using antibiotic ointment.      Patient is a 34-year-old female who presents with a wound to the right lower leg.  States bit by a bug 2 weeks ago.  Admits to scratching at it.  Cleaning with OTC.  Now red excoriated area.  No fever.          Prior to Admission Medications   Prescriptions Last Dose Informant Patient Reported? Taking?   desogestrel-ethinyl estradiol (APRI) 0.15-30 MG-MCG per tablet   Yes No   Sig: Take 1 tablet by mouth daily   diphenhydrAMINE-zinc acetate (BENADRYL) cream   No No   Sig: Apply topically 3 (three) times a day as needed for itching   hydrocortisone 2.5 % lotion   No No   Sig: Apply topically 2 (two) times a day   ibuprofen (MOTRIN) 400 mg tablet   No No   Sig: Take 1 tablet (400 mg total) by mouth every 6 (six) hours as needed for mild pain or moderate pain for up to 15 doses   lidocaine (LIDODERM) 5 %   No No   Sig: Apply 1 patch topically over 12 hours daily Remove & Discard patch within 12 hours or as directed by MD   methocarbamol (ROBAXIN) 500 mg tablet   No No   Sig: Take 1 tablet (500 mg total) by mouth 2 (two) times a day   naproxen (NAPROSYN) 500 mg tablet   No No   Sig: Take 1 tablet (500 mg total) by mouth 2 (two) times a day with meals      Facility-Administered Medications: None       History reviewed. No pertinent past medical history.    Past Surgical History:   Procedure Laterality Date     SECTION         History reviewed. No pertinent family history.  I have reviewed and agree with the history as documented.    E-Cigarette/Vaping    E-Cigarette Use Never User      E-Cigarette/Vaping Substances    Nicotine No     THC No     CBD No     Flavoring No     Other No     Unknown No      Social History     Tobacco Use    Smoking status: Every Day     Current packs/day: 0.25     Types:  Cigarettes    Smokeless tobacco: Never   Vaping Use    Vaping status: Never Used   Substance Use Topics    Alcohol use: Yes     Comment: rare    Drug use: Never       Review of Systems   Constitutional: Negative.    HENT: Negative.     Eyes: Negative.    Respiratory: Negative.     Cardiovascular: Negative.    Gastrointestinal: Negative.    Endocrine: Negative.    Genitourinary: Negative.    Musculoskeletal: Negative.    Skin:  Positive for wound.   Allergic/Immunologic: Negative.    Neurological: Negative.    Hematological: Negative.    Psychiatric/Behavioral: Negative.     All other systems reviewed and are negative.      Physical Exam  Physical Exam  Vitals and nursing note reviewed.   Constitutional:       Appearance: Normal appearance. She is normal weight.   HENT:      Head: Normocephalic and atraumatic.   Cardiovascular:      Rate and Rhythm: Normal rate and regular rhythm.      Pulses: Normal pulses.      Heart sounds: Normal heart sounds.   Musculoskeletal:      Cervical back: Normal range of motion and neck supple.   Skin:     General: Skin is warm.      Comments: Large area of erythematous excoriated skin on right lower lateral ankle.  Approx 1/2 palm size.  Surrounding erythema.  No streaking up the leg.     Neurological:      Mental Status: She is alert.         Vital Signs  ED Triage Vitals [09/04/24 1909]   Temperature Pulse Respirations Blood Pressure SpO2   98.7 °F (37.1 °C) 84 16 122/80 99 %      Temp Source Heart Rate Source Patient Position - Orthostatic VS BP Location FiO2 (%)   Oral Monitor Sitting Right arm --      Pain Score       --           Vitals:    09/04/24 1909   BP: 122/80   Pulse: 84   Patient Position - Orthostatic VS: Sitting         Visual Acuity      ED Medications  Medications   cephalexin (KEFLEX) capsule 500 mg (500 mg Oral Given 9/4/24 1944)       Diagnostic Studies  Results Reviewed       None                   No orders to display              Procedures  Procedures          ED Course                                 SBIRT 22yo+      Flowsheet Row Most Recent Value   Initial Alcohol Screen: US AUDIT-C     1. How often do you have a drink containing alcohol? 0 Filed at: 09/04/2024 1913   2. How many drinks containing alcohol do you have on a typical day you are drinking?  0 Filed at: 09/04/2024 1913   3b. FEMALE Any Age, or MALE 65+: How often do you have 4 or more drinks on one occassion? 0 Filed at: 09/04/2024 1913   Audit-C Score 0 Filed at: 09/04/2024 1913   SANDRA: How many times in the past year have you...    Used an illegal drug or used a prescription medication for non-medical reasons? Never Filed at: 09/04/2024 1913                      Medical Decision Making  Problems Addressed:  Cellulitis: acute illness or injury     Details: Trigger by scratching.  Will cover with abx due to large area of open skin.      Risk  Prescription drug management.                 Disposition  Final diagnoses:   Cellulitis     Time reflects when diagnosis was documented in both MDM as applicable and the Disposition within this note       Time User Action Codes Description Comment    9/4/2024  7:38 PM Lew Vann Add [L03.90] Cellulitis           ED Disposition       ED Disposition   Discharge    Condition   Stable    Date/Time   Wed Sep 4, 2024 1938    Comment   Bernadine Keen discharge to home/self care.                   Follow-up Information       Follow up With Specialties Details Why Contact Info    Viri Combs MD Family Medicine   [address] [place] [id number]  [phone]              Discharge Medication List as of 9/4/2024  7:38 PM        START taking these medications    Details   cephalexin (KEFLEX) 500 mg capsule Take 1 capsule (500 mg total) by mouth every 6 (six) hours for 7 days, Starting Wed 9/4/2024, Until Wed 9/11/2024, Normal           CONTINUE these medications which have NOT CHANGED    Details   desogestrel-ethinyl estradiol (APRI) 0.15-30 MG-MCG per  tablet Take 1 tablet by mouth daily, Starting Tue 4/16/2019, Historical Med      diphenhydrAMINE-zinc acetate (BENADRYL) cream Apply topically 3 (three) times a day as needed for itching, Starting Fri 8/16/2019, Print      hydrocortisone 2.5 % lotion Apply topically 2 (two) times a day, Starting Thu 9/30/2021, Print      ibuprofen (MOTRIN) 400 mg tablet Take 1 tablet (400 mg total) by mouth every 6 (six) hours as needed for mild pain or moderate pain for up to 15 doses, Starting Tue 5/2/2023, Normal      lidocaine (LIDODERM) 5 % Apply 1 patch topically over 12 hours daily Remove & Discard patch within 12 hours or as directed by MD, Starting Tue 5/2/2023, Normal      methocarbamol (ROBAXIN) 500 mg tablet Take 1 tablet (500 mg total) by mouth 2 (two) times a day, Starting Thu 6/2/2022, Normal      naproxen (NAPROSYN) 500 mg tablet Take 1 tablet (500 mg total) by mouth 2 (two) times a day with meals, Starting Tue 11/9/2021, Print             No discharge procedures on file.    PDMP Review       None            ED Provider  Electronically Signed by             Lew Vann MD  09/05/24 6262

## 2024-09-10 ENCOUNTER — HOSPITAL ENCOUNTER (EMERGENCY)
Facility: HOSPITAL | Age: 34
Discharge: HOME | End: 2024-09-10
Attending: EMERGENCY MEDICINE
Payer: COMMERCIAL

## 2024-09-10 VITALS
DIASTOLIC BLOOD PRESSURE: 51 MMHG | OXYGEN SATURATION: 100 % | TEMPERATURE: 98.3 F | WEIGHT: 106.6 LBS | HEART RATE: 78 BPM | SYSTOLIC BLOOD PRESSURE: 121 MMHG | BODY MASS INDEX: 19.5 KG/M2 | RESPIRATION RATE: 20 BRPM

## 2024-09-10 DIAGNOSIS — L03.90 CELLULITIS: Primary | ICD-10-CM

## 2024-09-10 PROCEDURE — 99282 EMERGENCY DEPT VISIT SF MDM: CPT

## 2024-09-10 PROCEDURE — 99284 EMERGENCY DEPT VISIT MOD MDM: CPT | Performed by: EMERGENCY MEDICINE

## 2024-09-10 RX ORDER — SULFAMETHOXAZOLE/TRIMETHOPRIM 800-160 MG
1 TABLET ORAL ONCE
Status: COMPLETED | OUTPATIENT
Start: 2024-09-10 | End: 2024-09-10

## 2024-09-10 RX ORDER — SULFAMETHOXAZOLE/TRIMETHOPRIM 800-160 MG
1 TABLET ORAL 2 TIMES DAILY
Qty: 14 TABLET | Refills: 0 | Status: SHIPPED | OUTPATIENT
Start: 2024-09-10 | End: 2024-09-17

## 2024-09-10 RX ADMIN — SULFAMETHOXAZOLE AND TRIMETHOPRIM 1 TABLET: 800; 160 TABLET ORAL at 19:29

## 2024-09-12 NOTE — ED PROVIDER NOTES
1. Cellulitis      ED Disposition       ED Disposition   Discharge    Condition   Stable    Date/Time   Tue Sep 10, 2024  7:28 PM    Comment   Bernadine Keen discharge to home/self care.                   Assessment & Plan       Medical Decision Making  Problems Addressed:  Cellulitis: acute illness or injury     Details: Well healing.  Patient still concerned.  Given info on wound care.  Will add bactrim for possible MRSA concerns.      Risk  Prescription drug management.                     Medications   sulfamethoxazole-trimethoprim (BACTRIM DS) 800-160 mg per tablet 1 tablet (1 tablet Oral Given 9/10/24 1929)       History of Present Illness       Patient is a 34-year-old female with cellulitis to the right lower leg.  Previous seen by me for same.  Almost done with abx.  Here for another eval.  States area is still oozing.  No fever.  Streaking up the leg.  No new injury, avoiding scratching it.             Review of Systems   Skin:  Positive for wound.           Objective     ED Triage Vitals [09/10/24 1916]   Temperature Pulse Blood Pressure Respirations SpO2 Patient Position - Orthostatic VS   98.3 °F (36.8 °C) 78 121/51 20 100 % Sitting      Temp Source Heart Rate Source BP Location FiO2 (%) Pain Score    Oral Monitor Right arm -- --        Physical Exam  Vitals and nursing note reviewed.   Constitutional:       Appearance: Normal appearance. She is normal weight.   HENT:      Head: Normocephalic and atraumatic.   Cardiovascular:      Rate and Rhythm: Normal rate and regular rhythm.      Pulses: Normal pulses.      Heart sounds: Normal heart sounds.   Pulmonary:      Effort: Pulmonary effort is normal.      Breath sounds: Normal breath sounds.   Musculoskeletal:      Cervical back: Normal range of motion and neck supple.   Skin:     General: Skin is warm.      Capillary Refill: Capillary refill takes less than 2 seconds.      Comments: Well healing cellulits on right lower leg.  Can make out full tattoo of the  Green M&M.  Some surrounding erythema.  Central of area still with top layer of dermis mission.  No drainage.    Neurological:      General: No focal deficit present.      Mental Status: She is alert and oriented to person, place, and time.         Labs Reviewed - No data to display  No orders to display       Procedures       Lew Vann MD  09/12/24 [id number]

## 2024-12-28 ENCOUNTER — APPOINTMENT (EMERGENCY)
Dept: RADIOLOGY | Facility: HOSPITAL | Age: 34
End: 2024-12-28
Payer: COMMERCIAL

## 2024-12-28 ENCOUNTER — HOSPITAL ENCOUNTER (EMERGENCY)
Facility: HOSPITAL | Age: 34
Discharge: HOME | End: 2024-12-28
Attending: EMERGENCY MEDICINE
Payer: COMMERCIAL

## 2024-12-28 VITALS
SYSTOLIC BLOOD PRESSURE: 126 MMHG | HEART RATE: 77 BPM | DIASTOLIC BLOOD PRESSURE: 53 MMHG | OXYGEN SATURATION: 98 % | RESPIRATION RATE: 16 BRPM | WEIGHT: 110.23 LBS | TEMPERATURE: 97.5 F | BODY MASS INDEX: 20.16 KG/M2

## 2024-12-28 DIAGNOSIS — R91.8 RIGHT PULMONARY INFILTRATE ON CXR: ICD-10-CM

## 2024-12-28 DIAGNOSIS — M54.6 ACUTE RIGHT-SIDED THORACIC BACK PAIN: Primary | ICD-10-CM

## 2024-12-28 PROCEDURE — 99284 EMERGENCY DEPT VISIT MOD MDM: CPT

## 2024-12-28 PROCEDURE — 71045 X-RAY EXAM CHEST 1 VIEW: CPT

## 2024-12-28 PROCEDURE — 99283 EMERGENCY DEPT VISIT LOW MDM: CPT

## 2024-12-28 RX ORDER — AZITHROMYCIN 250 MG/1
TABLET, FILM COATED ORAL
Qty: 6 TABLET | Refills: 0 | Status: SHIPPED | OUTPATIENT
Start: 2024-12-28 | End: 2025-01-01

## 2024-12-28 RX ORDER — ACETAMINOPHEN 325 MG/1
975 TABLET ORAL ONCE
Status: COMPLETED | OUTPATIENT
Start: 2024-12-28 | End: 2024-12-28

## 2024-12-28 RX ORDER — CYCLOBENZAPRINE HCL 10 MG
10 TABLET ORAL 2 TIMES DAILY PRN
Qty: 20 TABLET | Refills: 0 | Status: SHIPPED | OUTPATIENT
Start: 2024-12-28

## 2024-12-28 RX ORDER — IBUPROFEN 600 MG/1
600 TABLET, FILM COATED ORAL ONCE
Status: COMPLETED | OUTPATIENT
Start: 2024-12-28 | End: 2024-12-28

## 2024-12-28 RX ADMIN — ACETAMINOPHEN 975 MG: 325 TABLET, FILM COATED ORAL at 13:14

## 2024-12-28 RX ADMIN — IBUPROFEN 600 MG: 600 TABLET, FILM COATED ORAL at 13:14

## 2024-12-28 NOTE — ED PROVIDER NOTES
"Time reflects when diagnosis was documented in both MDM as applicable and the Disposition within this note       Time User Action Codes Description Comment    12/28/2024  1:21 PM Bobbi Boles Add [M54.6] Acute right-sided thoracic back pain     12/28/2024  1:22 PM Bobbi Boles Add [R91.8] Right pulmonary infiltrate on CXR           ED Disposition       ED Disposition   Discharge    Condition   Stable    Date/Time   Sat Dec 28, 2024  1:21 PM    Comment   Bernadine Keen discharge to home/self care.                   Assessment & Plan       Medical Decision Making  Differential diagnosis to include but not limited to: rib fracture, musculoskeletal pain, shingles prodrome, pneumonia. No vital signs concerning for PE and h&p not consistent with this either.   No \"chest pain\" there is chest WALL pain rather. CXR with potential area for right lower/middle lobe infiltrate when compared to previous imaging appears more dense. Will treat as musculoskeletal pain and as a pneumonia.  Discussed supportive care. Pt stable at time of discharge, vital signs reviewed, questions answered. Strict ER return precautions provided/discussed and were well understood by patient. Patient's vitals, labs and/or imaging results, diagnosis, and treatment plan were discussed with the patient. All new and/or changed medications were discussed - specifically to include route of administration, how often to take, when to take, and the pharmacy they were sent to. Strict return precautions as well as close follow up with PCP was discussed with the patient and the patient was agreeable to my recommendations.  Patient verbally acknowledged understanding. All labs, imaging were reviewed and used in the medical decision making process (if ordered).     Portions of this chart may have been written with voice recognition software.  Occasional grammatical errors, wrong word or \"sound a like\" substitutions may have occurred due to software limitations.  " Please read carefully and use context to recognize where substitutions have occurred.       Problems Addressed:  Acute right-sided thoracic back pain: acute illness or injury  Right pulmonary infiltrate on CXR: acute illness or injury    Amount and/or Complexity of Data Reviewed  External Data Reviewed: radiology and notes.  Radiology: ordered. Decision-making details documented in ED Course.    Risk  OTC drugs.  Prescription drug management.        ED Course as of 24 1444   Sat Dec 28, 2024   1314 XR chest 1 view portable  Per my interpretation, questionable increased density to the right middle/lower lobe.       Medications   acetaminophen (TYLENOL) tablet 975 mg (975 mg Oral Given 24 1314)   ibuprofen (MOTRIN) tablet 600 mg (600 mg Oral Given 24 1314)       ED Risk Strat Scores                          SBIRT 20yo+      Flowsheet Row Most Recent Value   Initial Alcohol Screen: US AUDIT-C     1. How often do you have a drink containing alcohol? 0 Filed at: 2024 1258   2. How many drinks containing alcohol do you have on a typical day you are drinking?  0 Filed at: 2024 1258   3b. FEMALE Any Age, or MALE 65+: How often do you have 4 or more drinks on one occassion? 0 Filed at: 2024 1258   Audit-C Score 0 Filed at: 2024 1258   SANDRA: How many times in the past year have you...    Used an illegal drug or used a prescription medication for non-medical reasons? Never Filed at: 2024 1258                            History of Present Illness       Chief Complaint   Patient presents with    Muscle Pain     Pt states she has R pain that travels along her ribs to her back, worse with coughing. Took advil this morning        History reviewed. No pertinent past medical history.   Past Surgical History:   Procedure Laterality Date     SECTION        History reviewed. No pertinent family history.   Social History     Tobacco Use    Smoking status: Every Day     Current  packs/day: 0.25     Types: Cigarettes    Smokeless tobacco: Never   Vaping Use    Vaping status: Never Used   Substance Use Topics    Alcohol use: Yes     Comment: rare    Drug use: Never      E-Cigarette/Vaping    E-Cigarette Use Never User       E-Cigarette/Vaping Substances    Nicotine No     THC No     CBD No     Flavoring No     Other No     Unknown No       I have reviewed and agree with the history as documented.     Bernadine is a 34 year old female presenting to the ED for evaluation of right sided chest muscle spasms x several days. Reports it initially began as left sided but then that resolved with ibuprofen and Tylenol and now has it on the right side. Is reproducible with movements of the arm or laying on it, is not associated with a shortness of breath sensation. Used a lidocaine patch last night with some relief. There are no overlying skin changes associated with it. Denies recent injury or trauma, long flights or drives, history of blood clots, recent unilateral leg swelling/pain. Her daughter was recently sick with pneumonia.         Review of Systems   Constitutional:  Negative for chills, fatigue and fever.   Respiratory:  Positive for cough. Negative for shortness of breath.    Cardiovascular:  Positive for chest pain. Negative for palpitations and leg swelling.   Gastrointestinal:  Negative for abdominal pain, diarrhea, nausea and vomiting.   Musculoskeletal:  Positive for back pain. Negative for myalgias, neck pain and neck stiffness.   Skin:  Negative for color change, rash and wound.   Neurological:  Negative for light-headedness and headaches.           Objective       ED Triage Vitals [12/28/24 1237]   Temperature Pulse Blood Pressure Respirations SpO2 Patient Position - Orthostatic VS   97.5 °F (36.4 °C) 77 126/53 16 98 % Sitting      Temp Source Heart Rate Source BP Location FiO2 (%) Pain Score    Tympanic Monitor Left arm -- --      Vitals      Date and Time Temp Pulse SpO2 Resp BP Pain  Score FACES Pain Rating User   12/28/24 1237 97.5 °F (36.4 °C) 77 98 % 16 126/53 -- -- FK            Physical Exam  Vitals reviewed.   Constitutional:       General: She is not in acute distress.     Appearance: Normal appearance. She is not ill-appearing or toxic-appearing.   HENT:      Head: Normocephalic and atraumatic.      Right Ear: External ear normal.      Left Ear: External ear normal.      Nose: Nose normal.   Eyes:      General: No scleral icterus.        Right eye: No discharge.         Left eye: No discharge.      Extraocular Movements: Extraocular movements intact.      Conjunctiva/sclera: Conjunctivae normal.   Cardiovascular:      Rate and Rhythm: Normal rate and regular rhythm.      Pulses: Normal pulses.      Heart sounds: Normal heart sounds.   Pulmonary:      Effort: Pulmonary effort is normal. No respiratory distress.      Breath sounds: Normal breath sounds.   Chest:          Comments: Tenderness to palpation of the chest wall musculature. No overlying skin changes.  Abdominal:      Palpations: Abdomen is soft.      Tenderness: There is no abdominal tenderness. There is no guarding.   Musculoskeletal:         General: Normal range of motion.      Cervical back: Normal range of motion and neck supple. No rigidity or tenderness.        Back:    Lymphadenopathy:      Cervical: No cervical adenopathy.   Skin:     General: Skin is warm and dry.      Capillary Refill: Capillary refill takes less than 2 seconds.   Neurological:      General: No focal deficit present.      Mental Status: She is alert.   Psychiatric:         Mood and Affect: Mood normal.         Behavior: Behavior normal.         Results Reviewed       None            XR chest 1 view portable    (Results Pending)       Procedures    ED Medication and Procedure Management   Prior to Admission Medications   Prescriptions Last Dose Informant Patient Reported? Taking?   desogestrel-ethinyl estradiol (APRI) 0.15-30 MG-MCG per tablet   Yes No    Sig: Take 1 tablet by mouth daily   diphenhydrAMINE-zinc acetate (BENADRYL) cream   No No   Sig: Apply topically 3 (three) times a day as needed for itching   hydrocortisone 2.5 % lotion   No No   Sig: Apply topically 2 (two) times a day   ibuprofen (MOTRIN) 400 mg tablet   No No   Sig: Take 1 tablet (400 mg total) by mouth every 6 (six) hours as needed for mild pain or moderate pain for up to 15 doses   lidocaine (LIDODERM) 5 %   No No   Sig: Apply 1 patch topically over 12 hours daily Remove & Discard patch within 12 hours or as directed by MD   methocarbamol (ROBAXIN) 500 mg tablet   No No   Sig: Take 1 tablet (500 mg total) by mouth 2 (two) times a day   naproxen (NAPROSYN) 500 mg tablet   No No   Sig: Take 1 tablet (500 mg total) by mouth 2 (two) times a day with meals      Facility-Administered Medications: None     Discharge Medication List as of 12/28/2024  1:23 PM        START taking these medications    Details   amoxicillin-clavulanate (AUGMENTIN) 875-125 mg per tablet Take 1 tablet by mouth every 12 (twelve) hours for 5 days, Starting Sat 12/28/2024, Until Thu 1/2/2025, Normal      azithromycin (ZITHROMAX) 250 mg tablet Take 2 tablets today then 1 tablet daily x 4 days, Normal      cyclobenzaprine (FLEXERIL) 10 mg tablet Take 1 tablet (10 mg total) by mouth 2 (two) times a day as needed for muscle spasms, Starting Sat 12/28/2024, Normal           CONTINUE these medications which have NOT CHANGED    Details   desogestrel-ethinyl estradiol (APRI) 0.15-30 MG-MCG per tablet Take 1 tablet by mouth daily, Starting Tue 4/16/2019, Historical Med      diphenhydrAMINE-zinc acetate (BENADRYL) cream Apply topically 3 (three) times a day as needed for itching, Starting Fri 8/16/2019, Print      hydrocortisone 2.5 % lotion Apply topically 2 (two) times a day, Starting Thu 9/30/2021, Print      ibuprofen (MOTRIN) 400 mg tablet Take 1 tablet (400 mg total) by mouth every 6 (six) hours as needed for mild pain or  moderate pain for up to 15 doses, Starting Tue 5/2/2023, Normal      lidocaine (LIDODERM) 5 % Apply 1 patch topically over 12 hours daily Remove & Discard patch within 12 hours or as directed by MD, Starting Tue 5/2/2023, Normal      methocarbamol (ROBAXIN) 500 mg tablet Take 1 tablet (500 mg total) by mouth 2 (two) times a day, Starting Thu 6/2/2022, Normal      naproxen (NAPROSYN) 500 mg tablet Take 1 tablet (500 mg total) by mouth 2 (two) times a day with meals, Starting Tue 11/9/2021, Print           No discharge procedures on file.  ED SEPSIS DOCUMENTATION   Time reflects when diagnosis was documented in both MDM as applicable and the Disposition within this note       Time User Action Codes Description Comment    12/28/2024  1:21 PM Bobbi Boles [M54.6] Acute right-sided thoracic back pain     12/28/2024  1:22 PM Bobbi Boles [R91.8] Right pulmonary infiltrate on CXR                  Bobbi Boles PA-C  12/28/24 0604

## 2024-12-28 NOTE — DISCHARGE INSTRUCTIONS
Rotate Tylenol and Motrin every 3 hours for best relief. For example, take Motrin then in 3 hours take Tylenol then 3 hours Motrin, repeat. Maximum Tylenol daily: 4,000 mg. Maximum ibuprofen daily: 3,600 mg.  Flexeril twice daily as needed for muscle spasm relief. Be aware this may make you drowsy so do not drive while taking this.   Warm compresses.   Take the antibiotics as prescribed.